# Patient Record
Sex: MALE | Race: WHITE | NOT HISPANIC OR LATINO | Employment: OTHER | URBAN - NONMETROPOLITAN AREA
[De-identification: names, ages, dates, MRNs, and addresses within clinical notes are randomized per-mention and may not be internally consistent; named-entity substitution may affect disease eponyms.]

---

## 2017-03-12 ENCOUNTER — HOSPITAL ENCOUNTER (INPATIENT)
Facility: HOSPITAL | Age: 47
LOS: 4 days | Discharge: HOME | End: 2017-03-16
Attending: EMERGENCY MEDICINE | Admitting: INTERNAL MEDICINE

## 2017-03-12 DIAGNOSIS — I25.119 CORONARY ARTERY DISEASE INVOLVING NATIVE CORONARY ARTERY OF NATIVE HEART WITH ANGINA PECTORIS (HCC): ICD-10-CM

## 2017-03-12 DIAGNOSIS — I15.9 SECONDARY HYPERTENSION: ICD-10-CM

## 2017-03-12 DIAGNOSIS — I21.3 ST ELEVATION MYOCARDIAL INFARCTION (STEMI), UNSPECIFIED ARTERY (HCC): Primary | ICD-10-CM

## 2017-03-12 DIAGNOSIS — I21.19 ST ELEVATION MYOCARDIAL INFARCTION (STEMI) OF INFERIOR WALL (HCC): ICD-10-CM

## 2017-03-12 PROBLEM — I10 ESSENTIAL HYPERTENSION: Status: ACTIVE | Noted: 2017-03-12

## 2017-03-12 PROBLEM — I25.10 CORONARY ARTERY DISEASE INVOLVING NATIVE CORONARY ARTERY OF NATIVE HEART: Status: ACTIVE | Noted: 2017-03-12

## 2017-03-12 LAB
ALBUMIN SERPL-MCNC: 4.6 G/DL (ref 3.5–5)
ALBUMIN/GLOB SERPL: 1.3 G/DL (ref 1–2)
ALP SERPL-CCNC: 96 U/L (ref 38–126)
ALT SERPL W P-5'-P-CCNC: 47 U/L (ref 13–69)
ANION GAP SERPL CALCULATED.3IONS-SCNC: 13 MMOL/L
AST SERPL-CCNC: 37 U/L (ref 15–46)
BASOPHILS # BLD AUTO: 0.05 10*3/MM3 (ref 0–0.2)
BASOPHILS NFR BLD AUTO: 0.4 % (ref 0–2.5)
BILIRUB SERPL-MCNC: 0.6 MG/DL (ref 0.2–1.3)
BUN BLD-MCNC: 20 MG/DL (ref 7–20)
BUN/CREAT SERPL: 18.2 (ref 6.3–21.9)
CALCIUM SPEC-SCNC: 10.2 MG/DL (ref 8.4–10.2)
CHLORIDE SERPL-SCNC: 100 MMOL/L (ref 98–107)
CO2 SERPL-SCNC: 32 MMOL/L (ref 26–30)
CREAT BLD-MCNC: 1.1 MG/DL (ref 0.6–1.3)
DEPRECATED RDW RBC AUTO: 42.2 FL (ref 37–54)
EOSINOPHIL # BLD AUTO: 0.03 10*3/MM3 (ref 0–0.7)
EOSINOPHIL NFR BLD AUTO: 0.3 % (ref 0–7)
ERYTHROCYTE [DISTWIDTH] IN BLOOD BY AUTOMATED COUNT: 12.9 % (ref 11.5–14.5)
GFR SERPL CREATININE-BSD FRML MDRD: 73 ML/MIN/1.73
GLOBULIN UR ELPH-MCNC: 3.6 GM/DL
GLUCOSE BLD-MCNC: 142 MG/DL (ref 74–98)
HCT VFR BLD AUTO: 48.7 % (ref 42–52)
HGB BLD-MCNC: 16.6 G/DL (ref 14–18)
IMM GRANULOCYTES # BLD: 0.04 10*3/MM3 (ref 0–0.06)
IMM GRANULOCYTES NFR BLD: 0.4 % (ref 0–0.6)
LYMPHOCYTES # BLD AUTO: 1.6 10*3/MM3 (ref 0.6–3.4)
LYMPHOCYTES NFR BLD AUTO: 14.3 % (ref 10–50)
MCH RBC QN AUTO: 30.4 PG (ref 27–31)
MCHC RBC AUTO-ENTMCNC: 34.1 G/DL (ref 30–37)
MCV RBC AUTO: 89.2 FL (ref 80–94)
MONOCYTES # BLD AUTO: 0.56 10*3/MM3 (ref 0–0.9)
MONOCYTES NFR BLD AUTO: 5 % (ref 0–12)
NEUTROPHILS # BLD AUTO: 8.88 10*3/MM3 (ref 2–6.9)
NEUTROPHILS NFR BLD AUTO: 79.6 % (ref 37–80)
NRBC BLD MANUAL-RTO: 0 /100 WBC (ref 0–0)
PLATELET # BLD AUTO: 303 10*3/MM3 (ref 130–400)
PMV BLD AUTO: 10 FL (ref 6–12)
POTASSIUM BLD-SCNC: 4 MMOL/L (ref 3.5–5.1)
PROT SERPL-MCNC: 8.2 G/DL (ref 6.3–8.2)
RBC # BLD AUTO: 5.46 10*6/MM3 (ref 4.7–6.1)
SODIUM BLD-SCNC: 141 MMOL/L (ref 137–145)
TROPONIN I SERPL-MCNC: 0.19 NG/ML (ref 0–0.05)
WBC NRBC COR # BLD: 11.16 10*3/MM3 (ref 4.8–10.8)

## 2017-03-12 PROCEDURE — 4A023N7 MEASUREMENT OF CARDIAC SAMPLING AND PRESSURE, LEFT HEART, PERCUTANEOUS APPROACH: ICD-10-PCS | Performed by: INTERNAL MEDICINE

## 2017-03-12 PROCEDURE — 84484 ASSAY OF TROPONIN QUANT: CPT | Performed by: EMERGENCY MEDICINE

## 2017-03-12 PROCEDURE — C9606 PERC D-E COR REVASC W AMI S: HCPCS | Performed by: INTERNAL MEDICINE

## 2017-03-12 PROCEDURE — C1887 CATHETER, GUIDING: HCPCS | Performed by: INTERNAL MEDICINE

## 2017-03-12 PROCEDURE — C1769 GUIDE WIRE: HCPCS | Performed by: INTERNAL MEDICINE

## 2017-03-12 PROCEDURE — 92941 PRQ TRLML REVSC TOT OCCL AMI: CPT | Performed by: INTERNAL MEDICINE

## 2017-03-12 PROCEDURE — 99291 CRITICAL CARE FIRST HOUR: CPT

## 2017-03-12 PROCEDURE — C1874 STENT, COATED/COV W/DEL SYS: HCPCS | Performed by: INTERNAL MEDICINE

## 2017-03-12 PROCEDURE — C9600 PERC DRUG-EL COR STENT SING: HCPCS | Performed by: INTERNAL MEDICINE

## 2017-03-12 PROCEDURE — 85347 COAGULATION TIME ACTIVATED: CPT

## 2017-03-12 PROCEDURE — 0272366 DILATION OF CORONARY ARTERY, THREE ARTERIES, BIFURCATION, WITH THREE DRUG-ELUTING INTRALUMINAL DEVICES, PERCUTANEOUS APPROACH: ICD-10-PCS | Performed by: INTERNAL MEDICINE

## 2017-03-12 PROCEDURE — C9601 PERC DRUG-EL COR STENT BRAN: HCPCS | Performed by: INTERNAL MEDICINE

## 2017-03-12 PROCEDURE — 0 IOPAMIDOL PER 1 ML: Performed by: INTERNAL MEDICINE

## 2017-03-12 PROCEDURE — 93454 CORONARY ARTERY ANGIO S&I: CPT | Performed by: INTERNAL MEDICINE

## 2017-03-12 PROCEDURE — 92928 PRQ TCAT PLMT NTRAC ST 1 LES: CPT | Performed by: INTERNAL MEDICINE

## 2017-03-12 PROCEDURE — 25010000002 HEPARIN (PORCINE) PER 1000 UNITS: Performed by: INTERNAL MEDICINE

## 2017-03-12 PROCEDURE — 99223 1ST HOSP IP/OBS HIGH 75: CPT | Performed by: INTERNAL MEDICINE

## 2017-03-12 PROCEDURE — 25010000002 MORPHINE PER 10 MG

## 2017-03-12 PROCEDURE — 25010000002 EPTIFIBATIDE PER 5 MG: Performed by: INTERNAL MEDICINE

## 2017-03-12 PROCEDURE — C1725 CATH, TRANSLUMIN NON-LASER: HCPCS | Performed by: INTERNAL MEDICINE

## 2017-03-12 PROCEDURE — 93005 ELECTROCARDIOGRAM TRACING: CPT

## 2017-03-12 PROCEDURE — C1894 INTRO/SHEATH, NON-LASER: HCPCS | Performed by: INTERNAL MEDICINE

## 2017-03-12 PROCEDURE — 85025 COMPLETE CBC W/AUTO DIFF WBC: CPT | Performed by: STUDENT IN AN ORGANIZED HEALTH CARE EDUCATION/TRAINING PROGRAM

## 2017-03-12 PROCEDURE — 80053 COMPREHEN METABOLIC PANEL: CPT | Performed by: STUDENT IN AN ORGANIZED HEALTH CARE EDUCATION/TRAINING PROGRAM

## 2017-03-12 PROCEDURE — 25010000002 MIDAZOLAM PER 1 MG: Performed by: INTERNAL MEDICINE

## 2017-03-12 DEVICE — XIENCE ALPINE EVEROLIMUS ELUTING CORONARY STENT SYSTEM 2.75 MM X 18 MM / RAPID-EXCHANGE
Type: IMPLANTABLE DEVICE | Site: ARTERIAL | Status: FUNCTIONAL
Brand: XIENCE ALPINE

## 2017-03-12 DEVICE — XIENCE ALPINE EVEROLIMUS ELUTING CORONARY STENT SYSTEM 4.00 MM X 33 MM / RAPID-EXCHANGE
Type: IMPLANTABLE DEVICE | Site: ARTERIAL | Status: FUNCTIONAL
Brand: XIENCE ALPINE

## 2017-03-12 DEVICE — XIENCE ALPINE EVEROLIMUS ELUTING CORONARY STENT SYSTEM 2.50 MM X 12 MM / RAPID-EXCHANGE
Type: IMPLANTABLE DEVICE | Site: ARTERIAL | Status: FUNCTIONAL
Brand: XIENCE ALPINE

## 2017-03-12 RX ORDER — METOPROLOL TARTRATE 5 MG/5ML
5 INJECTION INTRAVENOUS ONCE
Status: CANCELLED | OUTPATIENT
Start: 2017-03-12 | End: 2017-03-12

## 2017-03-12 RX ORDER — HEPARIN SODIUM 1000 [USP'U]/ML
INJECTION, SOLUTION INTRAVENOUS; SUBCUTANEOUS AS NEEDED
Status: DISCONTINUED | OUTPATIENT
Start: 2017-03-12 | End: 2017-03-12 | Stop reason: HOSPADM

## 2017-03-12 RX ORDER — METOPROLOL TARTRATE 5 MG/5ML
INJECTION INTRAVENOUS
Status: DISCONTINUED | OUTPATIENT
Start: 2017-03-12 | End: 2017-03-16 | Stop reason: HOSPADM

## 2017-03-12 RX ORDER — NITROGLYCERIN 5 MG/ML
INJECTION, SOLUTION INTRAVENOUS AS NEEDED
Status: DISCONTINUED | OUTPATIENT
Start: 2017-03-12 | End: 2017-03-12 | Stop reason: HOSPADM

## 2017-03-12 RX ORDER — NITROGLYCERIN 0.4 MG/1
0.4 TABLET SUBLINGUAL
Status: CANCELLED | OUTPATIENT
Start: 2017-03-12

## 2017-03-12 RX ORDER — METOPROLOL TARTRATE 5 MG/5ML
5 INJECTION INTRAVENOUS ONCE
Status: COMPLETED | OUTPATIENT
Start: 2017-03-12 | End: 2017-03-12

## 2017-03-12 RX ORDER — LIDOCAINE HYDROCHLORIDE 10 MG/ML
INJECTION, SOLUTION INFILTRATION; PERINEURAL AS NEEDED
Status: DISCONTINUED | OUTPATIENT
Start: 2017-03-12 | End: 2017-03-12 | Stop reason: HOSPADM

## 2017-03-12 RX ORDER — CLOPIDOGREL BISULFATE 75 MG/1
600 TABLET ORAL ONCE
Status: DISCONTINUED | OUTPATIENT
Start: 2017-03-12 | End: 2017-03-12

## 2017-03-12 RX ORDER — EPTIFIBATIDE 0.75 MG/ML
INJECTION, SOLUTION INTRAVENOUS CONTINUOUS PRN
Status: DISCONTINUED | OUTPATIENT
Start: 2017-03-12 | End: 2017-03-12 | Stop reason: HOSPADM

## 2017-03-12 RX ORDER — MORPHINE SULFATE 2 MG/ML
INJECTION, SOLUTION INTRAMUSCULAR; INTRAVENOUS AS NEEDED
Status: DISCONTINUED | OUTPATIENT
Start: 2017-03-12 | End: 2017-03-12 | Stop reason: HOSPADM

## 2017-03-12 RX ORDER — CLOPIDOGREL BISULFATE 75 MG/1
600 TABLET ORAL ONCE
Status: CANCELLED | OUTPATIENT
Start: 2017-03-12 | End: 2017-03-12

## 2017-03-12 RX ORDER — ASPIRIN 81 MG/1
324 TABLET, CHEWABLE ORAL ONCE
Status: CANCELLED | OUTPATIENT
Start: 2017-03-12 | End: 2017-03-12

## 2017-03-12 RX ORDER — LABETALOL HYDROCHLORIDE 5 MG/ML
INJECTION, SOLUTION INTRAVENOUS AS NEEDED
Status: DISCONTINUED | OUTPATIENT
Start: 2017-03-12 | End: 2017-03-12 | Stop reason: HOSPADM

## 2017-03-12 RX ORDER — SODIUM CHLORIDE 0.9 % (FLUSH) 0.9 %
10 SYRINGE (ML) INJECTION AS NEEDED
Status: CANCELLED | OUTPATIENT
Start: 2017-03-12

## 2017-03-12 RX ORDER — LISINOPRIL 40 MG/1
40 TABLET ORAL DAILY
Status: ON HOLD | COMMUNITY
End: 2017-03-13

## 2017-03-12 RX ORDER — MIDAZOLAM HYDROCHLORIDE 1 MG/ML
INJECTION INTRAMUSCULAR; INTRAVENOUS AS NEEDED
Status: DISCONTINUED | OUTPATIENT
Start: 2017-03-12 | End: 2017-03-12 | Stop reason: HOSPADM

## 2017-03-12 RX ORDER — NITROGLYCERIN 0.4 MG/1
TABLET SUBLINGUAL
Status: DISCONTINUED | OUTPATIENT
Start: 2017-03-12 | End: 2017-03-16 | Stop reason: HOSPADM

## 2017-03-12 RX ORDER — FLUOXETINE HYDROCHLORIDE 20 MG/1
40 CAPSULE ORAL DAILY
COMMUNITY

## 2017-03-12 RX ADMIN — NITROGLYCERIN 0.4 MG: 0.4 TABLET SUBLINGUAL at 20:59

## 2017-03-12 RX ADMIN — NITROGLYCERIN 0.4 MG: 0.4 TABLET SUBLINGUAL at 21:05

## 2017-03-12 RX ADMIN — METOPROLOL TARTRATE 5 MG: 1 INJECTION, SOLUTION INTRAVENOUS at 21:15

## 2017-03-12 RX ADMIN — ASPIRIN 325 MG ORAL TABLET 325 MG: 325 PILL ORAL at 21:05

## 2017-03-12 RX ADMIN — TICAGRELOR 180 MG: 90 TABLET ORAL at 21:05

## 2017-03-12 RX ADMIN — METOPROLOL TARTRATE 5 MG: 1 INJECTION, SOLUTION INTRAVENOUS at 21:04

## 2017-03-12 RX ADMIN — METOPROLOL TARTRATE 5 MG: 1 INJECTION, SOLUTION INTRAVENOUS at 21:14

## 2017-03-12 RX ADMIN — NITROGLYCERIN 0.4 MG: 0.4 TABLET SUBLINGUAL at 21:08

## 2017-03-12 RX ADMIN — NITROGLYCERIN 0.4 MG: 0.4 TABLET SUBLINGUAL at 21:09

## 2017-03-13 LAB
ACT BLD: 208 SECONDS (ref 82–152)
ACT BLD: 325 SECONDS (ref 82–152)
ANION GAP SERPL CALCULATED.3IONS-SCNC: 12.2 MMOL/L
BUN BLD-MCNC: 17 MG/DL (ref 7–20)
BUN/CREAT SERPL: 17 (ref 6.3–21.9)
CALCIUM SPEC-SCNC: 9 MG/DL (ref 8.4–10.2)
CHLORIDE SERPL-SCNC: 103 MMOL/L (ref 98–107)
CHOLEST SERPL-MCNC: 158 MG/DL (ref 0–199)
CO2 SERPL-SCNC: 28 MMOL/L (ref 26–30)
CREAT BLD-MCNC: 1 MG/DL (ref 0.6–1.3)
DEPRECATED RDW RBC AUTO: 42 FL (ref 37–54)
ERYTHROCYTE [DISTWIDTH] IN BLOOD BY AUTOMATED COUNT: 12.9 % (ref 11.5–14.5)
GFR SERPL CREATININE-BSD FRML MDRD: 81 ML/MIN/1.73
GLUCOSE BLD-MCNC: 141 MG/DL (ref 74–98)
HCT VFR BLD AUTO: 42.9 % (ref 42–52)
HDLC SERPL-MCNC: 32 MG/DL (ref 40–60)
HGB BLD-MCNC: 14.5 G/DL (ref 14–18)
HOLD SPECIMEN: NORMAL
HOLD SPECIMEN: NORMAL
LDLC SERPL CALC-MCNC: 94 MG/DL (ref 0–99)
LDLC/HDLC SERPL: 2.94 {RATIO}
MCH RBC QN AUTO: 30 PG (ref 27–31)
MCHC RBC AUTO-ENTMCNC: 33.8 G/DL (ref 30–37)
MCV RBC AUTO: 88.8 FL (ref 80–94)
PLATELET # BLD AUTO: 259 10*3/MM3 (ref 130–400)
PMV BLD AUTO: 10.1 FL (ref 6–12)
POTASSIUM BLD-SCNC: 4.2 MMOL/L (ref 3.5–5.1)
RBC # BLD AUTO: 4.83 10*6/MM3 (ref 4.7–6.1)
SODIUM BLD-SCNC: 139 MMOL/L (ref 137–145)
TRIGL SERPL-MCNC: 159 MG/DL
VLDLC SERPL-MCNC: 31.8 MG/DL
WBC NRBC COR # BLD: 14.6 10*3/MM3 (ref 4.8–10.8)
WHOLE BLOOD HOLD SPECIMEN: NORMAL
WHOLE BLOOD HOLD SPECIMEN: NORMAL

## 2017-03-13 PROCEDURE — 25010000002 EPTIFIBATIDE PER 5 MG: Performed by: INTERNAL MEDICINE

## 2017-03-13 PROCEDURE — 80048 BASIC METABOLIC PNL TOTAL CA: CPT | Performed by: INTERNAL MEDICINE

## 2017-03-13 PROCEDURE — 83036 HEMOGLOBIN GLYCOSYLATED A1C: CPT | Performed by: INTERNAL MEDICINE

## 2017-03-13 PROCEDURE — 25010000002 MORPHINE PER 10 MG: Performed by: INTERNAL MEDICINE

## 2017-03-13 PROCEDURE — 99232 SBSQ HOSP IP/OBS MODERATE 35: CPT | Performed by: INTERNAL MEDICINE

## 2017-03-13 PROCEDURE — 94799 UNLISTED PULMONARY SVC/PX: CPT

## 2017-03-13 PROCEDURE — 80061 LIPID PANEL: CPT | Performed by: INTERNAL MEDICINE

## 2017-03-13 PROCEDURE — 85027 COMPLETE CBC AUTOMATED: CPT | Performed by: INTERNAL MEDICINE

## 2017-03-13 PROCEDURE — 87081 CULTURE SCREEN ONLY: CPT | Performed by: INTERNAL MEDICINE

## 2017-03-13 RX ORDER — ATORVASTATIN CALCIUM 80 MG/1
80 TABLET, FILM COATED ORAL NIGHTLY
Status: DISCONTINUED | OUTPATIENT
Start: 2017-03-13 | End: 2017-03-16 | Stop reason: HOSPADM

## 2017-03-13 RX ORDER — FLUOXETINE HYDROCHLORIDE 20 MG/1
40 CAPSULE ORAL DAILY
Status: DISCONTINUED | OUTPATIENT
Start: 2017-03-13 | End: 2017-03-16 | Stop reason: HOSPADM

## 2017-03-13 RX ORDER — HYDROCODONE BITARTRATE AND ACETAMINOPHEN 5; 325 MG/1; MG/1
1 TABLET ORAL EVERY 4 HOURS PRN
Status: DISCONTINUED | OUTPATIENT
Start: 2017-03-13 | End: 2017-03-16

## 2017-03-13 RX ORDER — ONDANSETRON 4 MG/1
4 TABLET, ORALLY DISINTEGRATING ORAL EVERY 6 HOURS PRN
Status: DISCONTINUED | OUTPATIENT
Start: 2017-03-13 | End: 2017-03-16

## 2017-03-13 RX ORDER — CALCIUM CARBONATE 200(500)MG
2 TABLET,CHEWABLE ORAL 3 TIMES DAILY PRN
Status: DISCONTINUED | OUTPATIENT
Start: 2017-03-13 | End: 2017-03-16

## 2017-03-13 RX ORDER — RAMIPRIL 5 MG/1
5 CAPSULE ORAL DAILY
Status: DISCONTINUED | OUTPATIENT
Start: 2017-03-13 | End: 2017-03-16 | Stop reason: HOSPADM

## 2017-03-13 RX ORDER — METOPROLOL TARTRATE 50 MG/1
50 TABLET, FILM COATED ORAL EVERY 12 HOURS SCHEDULED
Status: DISCONTINUED | OUTPATIENT
Start: 2017-03-13 | End: 2017-03-15

## 2017-03-13 RX ORDER — DIPHENHYDRAMINE HYDROCHLORIDE 50 MG/ML
25 INJECTION INTRAMUSCULAR; INTRAVENOUS EVERY 6 HOURS PRN
Status: DISCONTINUED | OUTPATIENT
Start: 2017-03-13 | End: 2017-03-16

## 2017-03-13 RX ORDER — ONDANSETRON 4 MG/1
4 TABLET, FILM COATED ORAL EVERY 6 HOURS PRN
Status: DISCONTINUED | OUTPATIENT
Start: 2017-03-13 | End: 2017-03-16

## 2017-03-13 RX ORDER — ASPIRIN 325 MG
325 TABLET ORAL ONCE
Status: COMPLETED | OUTPATIENT
Start: 2017-03-12 | End: 2017-03-12

## 2017-03-13 RX ORDER — SODIUM CHLORIDE 9 MG/ML
100 INJECTION, SOLUTION INTRAVENOUS CONTINUOUS
Status: DISCONTINUED | OUTPATIENT
Start: 2017-03-13 | End: 2017-03-13

## 2017-03-13 RX ORDER — SENNA AND DOCUSATE SODIUM 50; 8.6 MG/1; MG/1
2 TABLET, FILM COATED ORAL NIGHTLY
Status: DISCONTINUED | OUTPATIENT
Start: 2017-03-13 | End: 2017-03-16

## 2017-03-13 RX ORDER — NICOTINE 21 MG/24HR
1 PATCH, TRANSDERMAL 24 HOURS TRANSDERMAL EVERY 24 HOURS
Status: DISCONTINUED | OUTPATIENT
Start: 2017-03-13 | End: 2017-03-16 | Stop reason: HOSPADM

## 2017-03-13 RX ORDER — NITROGLYCERIN 0.4 MG/1
0.4 TABLET SUBLINGUAL ONCE
Status: COMPLETED | OUTPATIENT
Start: 2017-03-12 | End: 2017-03-12

## 2017-03-13 RX ORDER — ASPIRIN 81 MG/1
81 TABLET, CHEWABLE ORAL DAILY
Status: DISCONTINUED | OUTPATIENT
Start: 2017-03-13 | End: 2017-03-16 | Stop reason: HOSPADM

## 2017-03-13 RX ORDER — MORPHINE SULFATE 2 MG/ML
1 INJECTION, SOLUTION INTRAMUSCULAR; INTRAVENOUS EVERY 4 HOURS PRN
Status: DISCONTINUED | OUTPATIENT
Start: 2017-03-13 | End: 2017-03-16

## 2017-03-13 RX ORDER — NALOXONE HCL 0.4 MG/ML
0.4 VIAL (ML) INJECTION
Status: DISCONTINUED | OUTPATIENT
Start: 2017-03-13 | End: 2017-03-16

## 2017-03-13 RX ORDER — ALPRAZOLAM 0.5 MG/1
0.5 TABLET ORAL 3 TIMES DAILY PRN
Status: DISCONTINUED | OUTPATIENT
Start: 2017-03-13 | End: 2017-03-16

## 2017-03-13 RX ORDER — TEMAZEPAM 15 MG/1
15 CAPSULE ORAL NIGHTLY PRN
Status: DISCONTINUED | OUTPATIENT
Start: 2017-03-13 | End: 2017-03-16

## 2017-03-13 RX ORDER — METOPROLOL TARTRATE 5 MG/5ML
5 INJECTION INTRAVENOUS ONCE
Status: COMPLETED | OUTPATIENT
Start: 2017-03-12 | End: 2017-03-12

## 2017-03-13 RX ORDER — BISACODYL 10 MG
10 SUPPOSITORY, RECTAL RECTAL DAILY PRN
Status: DISCONTINUED | OUTPATIENT
Start: 2017-03-13 | End: 2017-03-16

## 2017-03-13 RX ORDER — EPTIFIBATIDE 0.75 MG/ML
2 INJECTION, SOLUTION INTRAVENOUS CONTINUOUS
Status: DISCONTINUED | OUTPATIENT
Start: 2017-03-13 | End: 2017-03-13

## 2017-03-13 RX ORDER — ONDANSETRON 2 MG/ML
4 INJECTION INTRAMUSCULAR; INTRAVENOUS EVERY 6 HOURS PRN
Status: DISCONTINUED | OUTPATIENT
Start: 2017-03-13 | End: 2017-03-16

## 2017-03-13 RX ADMIN — HYDROCODONE BITARTRATE AND ACETAMINOPHEN 1 TABLET: 5; 325 TABLET ORAL at 19:55

## 2017-03-13 RX ADMIN — RAMIPRIL 5 MG: 5 CAPSULE ORAL at 09:07

## 2017-03-13 RX ADMIN — EPTIFIBATIDE 2 MCG/KG/MIN: 75 INJECTION INTRAVENOUS at 00:50

## 2017-03-13 RX ADMIN — SODIUM CHLORIDE 100 ML/HR: 9 INJECTION, SOLUTION INTRAVENOUS at 00:50

## 2017-03-13 RX ADMIN — MORPHINE SULFATE 1 MG: 2 INJECTION, SOLUTION INTRAMUSCULAR; INTRAVENOUS at 08:42

## 2017-03-13 RX ADMIN — TICAGRELOR 90 MG: 90 TABLET ORAL at 09:07

## 2017-03-13 RX ADMIN — EPTIFIBATIDE 2 MCG/KG/MIN: 75 INJECTION INTRAVENOUS at 03:31

## 2017-03-13 RX ADMIN — ATORVASTATIN CALCIUM 80 MG: 80 TABLET, FILM COATED ORAL at 00:31

## 2017-03-13 RX ADMIN — SODIUM CHLORIDE 100 ML/HR: 9 INJECTION, SOLUTION INTRAVENOUS at 06:00

## 2017-03-13 RX ADMIN — METOPROLOL TARTRATE 50 MG: 50 TABLET ORAL at 20:49

## 2017-03-13 RX ADMIN — METOPROLOL TARTRATE 50 MG: 50 TABLET ORAL at 00:31

## 2017-03-13 RX ADMIN — ATORVASTATIN CALCIUM 80 MG: 80 TABLET, FILM COATED ORAL at 20:50

## 2017-03-13 RX ADMIN — HYDROCODONE BITARTRATE AND ACETAMINOPHEN 1 TABLET: 5; 325 TABLET ORAL at 11:14

## 2017-03-13 RX ADMIN — FLUOXETINE 40 MG: 20 CAPSULE ORAL at 11:14

## 2017-03-13 RX ADMIN — METOPROLOL TARTRATE 50 MG: 50 TABLET ORAL at 09:07

## 2017-03-13 RX ADMIN — NICOTINE 1 PATCH: 21 PATCH TRANSDERMAL at 00:31

## 2017-03-13 RX ADMIN — TICAGRELOR 90 MG: 90 TABLET ORAL at 18:04

## 2017-03-13 RX ADMIN — ASPIRIN 81 MG 81 MG: 81 TABLET ORAL at 09:07

## 2017-03-13 RX ADMIN — DOCUSATE SODIUM AND SENNOSIDES 2 TABLET: 8.6; 5 TABLET, FILM COATED ORAL at 00:31

## 2017-03-13 NOTE — PROGRESS NOTES
"[facility]-Cardiology Progress note     LOS: 1 day   Patient Care Team:  No Known Provider as PCP - General    Chief Complaint:  Chest pain    Subjective     Interval History:   Over the last 12 hours the patient has had one brief episode of localized chest discomfort in his lower central sternum that occurred at rest and was described as a dull aching pain that lasted less than 5 seconds.  He has had no recurrence of the retrosternal burning discomfort which prompted admission.  His 12-lead electrocardiogram has normalized with ST segment elevation returning to baseline.  He has had no signs or symptoms of congestive heart failure and there has been no arrhythmia on the monitor.  He is currently completing an 18 hour course of Integrilin.  He has had no bleeding symptoms.  His hemoglobin and hematocrit have remained stable.  He has had no symptoms related to his catheterization access from the left radial approach.  Kidney function is normal.  Due to the complexity of his catheterization and his large body habitus the patient received a total of 6.0 esther of radiation.  His chest and back has been inspected and there are no signs of erythema or other radiation-related skin injury.  Patient Complaints: none  Patient Denies:  Shortness of breath, orthopnea, PND, lower extremity edema, palpitations  History taken from: patient    Review of Systems:   All systems were reviewed and negative       Objective     Vital Sign Min/Max for last 24 hours  Temp  Min: 98 °F (36.7 °C)  Max: 98.7 °F (37.1 °C)   BP  Min: 144/107  Max: 218/145   Pulse  Min: 72  Max: 100   Resp  Min: 16  Max: 20   SpO2  Min: 94 %  Max: 100 %   Flow (L/min)  Min: 2  Max: 2   Weight  Min: 270 lb (122 kg)  Max: 275 lb 4.8 oz (125 kg)     Flowsheet Rows         First Filed Value    Admission Height  69\" (175.3 cm) Documented at 03/12/2017 2042    Admission Weight  270 lb (122 kg) Documented at 03/12/2017 2042          Physical Exam:     General Appearance:    " Alert, cooperative, in no acute distress   Head:    Normocephalic, without obvious abnormality, atraumatic   Eyes:            Lids and lashes normal, conjunctivae and sclerae normal, no   icterus, no pallor, corneas clear, PERRLA   Ears:    Ears appear intact with no abnormalities noted   Throat:   No oral lesions, no thrush, oral mucosa moist   Neck:   No adenopathy, supple, trachea midline, no thyromegaly, no   carotid bruit, no JVD   Back:     No kyphosis present, no scoliosis present, no skin lesions,      erythema or scars, no tenderness to percussion or                   palpation,   range of motion normal   Lungs:     Clear to auscultation,respirations regular, even and                  unlabored    Heart:    Regular rhythm and normal rate, normal S1 and S2, no            murmur, no gallop, no rub, no click   Chest Wall:    No abnormalities observed   Abdomen:     Normal bowel sounds, no masses, no organomegaly, soft        non-tender, non-distended, no guarding, no rebound                tenderness   Rectal:     Deferred   Extremities:   Moves all extremities well, no edema, no cyanosis, no             redness   Pulses:   Pulses palpable and equal bilaterally   Skin:   No bleeding, bruising or rash   Lymph nodes:   No palpable adenopathy   Neurologic:   Cranial nerves 2 - 12 grossly intact, sensation intact, DTR       present and equal bilaterally        Results Review:     I reviewed the patient's new clinical results.        Results from last 7 days  Lab Units 03/13/17  0440   SODIUM mmol/L 139   POTASSIUM mmol/L 4.2   CHLORIDE mmol/L 103   TOTAL CO2 mmol/L 28.0   BUN mg/dL 17   CREATININE mg/dL 1.00   GLUCOSE mg/dL 141*   CALCIUM mg/dL 9.0       Results from last 7 days  Lab Units 03/13/17  0440 03/12/17  2045   WBC 10*3/mm3 14.60* 11.16*   HEMOGLOBIN g/dL 14.5 16.6   HEMATOCRIT % 42.9 48.7   PLATELETS 10*3/mm3 259 303         Physical Exam    Medication Review: yes    Assessment/Plan     Principal  Problem:    ST elevation myocardial infarction (STEMI) of inferior wall- resolution of chest discomfort and normalization of the 12-lead electrocardiogram  Active Problems:    Coronary artery disease involving native coronary artery of native heart- the patient had a terribly complex occlusion of the large first obtuse marginal branch which was a proximally bifurcating lesion.  The ostium of the vessel was thrombotic clinically occluded and the ostium of a lower pole branch was also thrombotic clinically occluded.  This required a complex bifurcational stent procedure in order to achieve an adequate result.  Unfortunately the patient was extremely uncooperative being unable to hold his left arm over his lower abdomen for any length of time(the procedure was performed from the left radial approach).  Ultimately the patient required a final kissing balloon to optimize the stent result but this was not possible due to poor guide catheter support as well as lack of adequate lumen in the 6 Bangladeshi sheath.  When attempting to adjust the position of one angioplasty balloon it would greatly alter the position of the other angioplasty balloon in the opposing vessel.  Ultimately all guide catheter access was lost and despite multiple efforts the left coronary could not be reengaged from the left radial approach.  For this reason the patient will require a staged procedure in 48 hours performed from the femoral approach using an 8 Bangladeshi system.  This will allow kissing balloon angioplasty to optimize the bifurcational stent procedure.    Essential hypertension    ST elevation myocardial infarction (STEMI)- Killip class I.  No recurrent angina, signs or symptoms of congestive heart failure or arrhythmia.  The patient will transfer out of the intensive care unit today.              Dale Stuart MD  03/13/17  9:35 AM

## 2017-03-13 NOTE — PLAN OF CARE
Problem: Pain, Acute (Adult)  Goal: Acceptable Pain Control/Comfort Level  Outcome: Ongoing (interventions implemented as appropriate)    Problem: Hypertensive Disease/Crisis (Arterial) (Adult)  Goal: Signs and Symptoms of Listed Potential Problems Will be Absent or Manageable (Hypertensive Disease/Crisis)  Outcome: Ongoing (interventions implemented as appropriate)

## 2017-03-13 NOTE — PLAN OF CARE
Problem: Pain, Acute (Adult)  Goal: Identify Related Risk Factors and Signs and Symptoms  Outcome: Outcome(s) achieved Date Met:  03/13/17  Goal: Acceptable Pain Control/Comfort Level  Outcome: Ongoing (interventions implemented as appropriate)    Problem: Hypertensive Disease/Crisis (Arterial) (Adult)  Goal: Signs and Symptoms of Listed Potential Problems Will be Absent or Manageable (Hypertensive Disease/Crisis)  Outcome: Ongoing (interventions implemented as appropriate)

## 2017-03-13 NOTE — NURSING NOTE
Report to Monika Ballesteros RN Third Floor.  Pt was transported to [address]/ [facility].

## 2017-03-13 NOTE — PLAN OF CARE
Problem: Cardiac Catheterization with/without PCI (Adult)  Goal: Signs and Symptoms of Listed Potential Problems Will be Absent or Manageable (Cardiac Catheterization with/without PCI)  Outcome: Ongoing (interventions implemented as appropriate)    03/13/17 0831   Cardiac Catheterization with/without PCI   Problems Assessed (Cardiac Catheterization) all   Problems Present (Cardiac Catheterization) none         Problem: Pain, Acute (Adult)  Goal: Identify Related Risk Factors and Signs and Symptoms  Outcome: Outcome(s) achieved Date Met:  03/13/17  Goal: Acceptable Pain Control/Comfort Level  Outcome: Ongoing (interventions implemented as appropriate)

## 2017-03-13 NOTE — NURSING NOTE
Pt transferred from ICU to room 326.  Pt walked from bed in the hallway to the bed in the room.  No complaints of pain and pt is resting comfortable.  Izabela RN gave report. Patient has been educated on the call light and his room.  Will continue to monitor patient.

## 2017-03-13 NOTE — ED PROVIDER NOTES
Subjective   HPI Comments: Patient presents the emergency department with complaint of crushing chest pain that began substernally approximately 90 minutes prior to arrival while at rest.  Patient denies any shortness of breath is experiencing pain going straight through to his back as well as both sides of his jaw.  Denies syncope or diaphoresis or vomiting.  Patient has no prior history of coronary artery disease.  He has a well-documented history of hypertension and has been out of his blood pressure medications for approximately 1 month now.      Dr. Garza was consulted immediately EKG changes are noted.      History provided by:  Patient      Review of Systems   Constitutional: Negative.  Negative for diaphoresis and fever.   HENT: Negative for sore throat.    Eyes: Negative.  Negative for pain and visual disturbance.   Respiratory: Positive for chest tightness. Negative for cough, shortness of breath, wheezing and stridor.    Cardiovascular: Positive for chest pain. Negative for palpitations and leg swelling.        The history of present illness   Gastrointestinal: Negative.  Negative for abdominal pain, diarrhea, nausea and vomiting.   Endocrine: Negative.    Genitourinary: Negative.  Negative for dysuria.   Musculoskeletal: Negative.  Negative for back pain and neck pain.   Skin: Negative.  Negative for pallor and rash.   Allergic/Immunologic: Negative.    Neurological: Negative.  Negative for syncope and headaches.   Hematological: Negative.    Psychiatric/Behavioral: Negative.  Negative for agitation.   All other systems reviewed and are negative.      Past Medical History   Diagnosis Date   • Hypertension        No Known Allergies    Past Surgical History   Procedure Laterality Date   • Mastoid surgery         History reviewed. No pertinent family history.    Social History     Social History   • Marital status: N/A     Spouse name: N/A   • Number of children: N/A   • Years of education: N/A     Social  History Main Topics   • Smoking status: Current Every Day Smoker     Packs/day: 0.25   • Smokeless tobacco: None   • Alcohol use Yes      Comment: rarely    • Drug use: No   • Sexual activity: Not Asked     Other Topics Concern   • None     Social History Narrative   • None           Objective   Physical Exam   Constitutional: He is oriented to person, place, and time. He appears well-developed.   Patient is very hypertensive.   HENT:   Head: Normocephalic.   Eyes: EOM are normal. Pupils are equal, round, and reactive to light.   Neck: Normal range of motion. Neck supple.   Cardiovascular: Normal rate and regular rhythm.    Pulmonary/Chest: Effort normal. He has no wheezes. He has no rales.   Abdominal: Soft. Bowel sounds are normal. He exhibits no distension. There is no rebound and no guarding.   Musculoskeletal: Normal range of motion. He exhibits no edema.   Neurological: He is alert and oriented to person, place, and time.   Skin: Skin is warm and dry.   Psychiatric: He has a normal mood and affect.   Nursing note and vitals reviewed.      Procedures         ED Course  ED Course   Comment By Time   Dr. Garza is at bedside.  He has already initiated the STEMI team.  Patient is stable. Cindi Anthony, MARICEL 03/12 2102   Activate stemi @2053     Uri Garza MD 03/12 2107                  MDM  Number of Diagnoses or Management Options  Diagnosis management comments: EKG showed sinus rhythm rate of 99.  There are ST elevations in leads 2, 3, aVF as well as V5 and V6.  I-STAT troponin did come back positive at 0.19.  Dr. Stuart was contacted and Cath Lab was activated.  Patient was treated with standard STEMI medications.  Blood pressure is grossly elevated at this time but otherwise is hemodynamically stable       Amount and/or Complexity of Data Reviewed  Clinical lab tests: ordered and reviewed  Obtain history from someone other than the patient: yes    Critical Care  Total time providing critical care:  30-74 minutes      Final diagnoses:   None            Uri Garza MD  03/12/17 9559

## 2017-03-13 NOTE — PROGRESS NOTES
Continued Stay Note  [facility]     Patient Name: Edgar Carbajal  MRN: [id number]  Today's Date: 3/13/2017    Admit Date: 3/12/2017          Discharge Plan       03/13/17 1314    Case Management/Social Work Plan    Plan SW saw patient due to request /concern he could not afford his blood pressure medications. SW spoke to patient who does not feel he has missed any medication. Reveiw of his chart finds he is on Brillinta and has private insurance. Provided patient with contact information as well as a copay card for Brillinta.      Patient/Family In Agreement With Plan yes    Additional Comments Discussed samples and gave copay card for Brillinta.     Final Note    Final Note SW following for medication assistance.       03/13/17 1018    Case Management/Social Work Plan    Plan Discharge Planning    Additional Comments SW met with pt at bedside in ICU for initial discharge planning. Pt states that he lives alone in an apartment and independent with ADL's. Pt denies DME/HH affiliation. Pt also denies home oxygen use. Pt's goal at discharge is to return home. Pt denies any needs at this time. Will continue to follow and assist should discharge needs change.              Discharge Codes     None        Expected Discharge Date and Time     Expected Discharge Date Expected Discharge Time    Mar 14, 2017             Lashawn Cortez

## 2017-03-13 NOTE — NURSING NOTE
CRYSTAL Teaching for MI  Instructed on what it is, causes, signs and symptoms and treatment with teachback

## 2017-03-13 NOTE — H&P
Patient Care Team:  No Known Provider as PCP - General    Chief complaint chest pain    Subjective     Patient is a 46 y.o. male presents with chest pain. Onset of symptoms was abrupt starting 1 hour ago.  Symptoms are associated with shortness of breath, nausea and diaphoresis.  Symptoms are aggravated by the.   Symptoms improve with no specific therapy.  Severity 8/10 in intensity Context occurred at rest Quality retrosternal burning.  The patient indicates that after eating supper he developed a severe burning sensation in his retrosternal area that radiated into his jaws bilaterally and down both arms.  This was associated with nausea, diaphoresis and shortness of breath.  The patient promptly reported to the emergency room where his initial 12-lead electrocardiogram showed ST segment elevation in leads 2,3 and aVF and reciprocal ST segment depression in leads V1 and V2.  This injury pattern was consistent with an acute inferior ST elevation myocardial infarction.  His initial troponin was 0.19.  The patient has no prior history of myocardial infarction or coronary revascularization.  He does have a history of hypertension.  His blood pressure was severely elevated on presentation.  The patient was given aspirin, 180 mg of Birlinta and intravenous Lopressor.  He was also given sublingual nitroglycerin.  The STEMI alert was called immediately.  The patient indicates that he had had no prior chest discomfort before presenting this evening.  He had no dyspnea with activity.  There is no orthopnea PND or lower extremity edema.  He has no dizziness palpitations or syncope.  The patient had no personal history of documented diabetes or hypercholesterolemia.  He has no history of stroke or TIA.  He has no history of previous internal bleeding.  He has no history of documented valvular heart disease, arrhythmia or congestive heart failure.    Review of Systems   Pertinent items are noted in HPI, all other systems  reviewed and negative    History  Past Medical History   Diagnosis Date   • Hypertension      Past Surgical History   Procedure Laterality Date   • Mastoid surgery       History reviewed. No pertinent family history.  Social History   Substance Use Topics   • Smoking status: Current Every Day Smoker     Packs/day: 0.25   • Smokeless tobacco: None   • Alcohol use Yes      Comment: rarely      Prescriptions Prior to Admission   Medication Sig Dispense Refill Last Dose   • FLUoxetine (PROzac) 20 MG capsule Take 40 mg by mouth Daily.   Past Month at Unknown time   • lisinopril (PRINIVIL,ZESTRIL) 40 MG tablet Take 40 mg by mouth Daily.   Past Month at Unknown time     Allergies:  Review of patient's allergies indicates no known allergies.    Objective     Vital Signs  Temp:  [98.7 °F (37.1 °C)] 98.7 °F (37.1 °C)  Heart Rate:  [] 85  Resp:  [20] 20  BP: (150-218)/() 150/98    Physical Exam:      General Appearance:    Alert, cooperative, in no acute distress   Head:    Normocephalic, without obvious abnormality, atraumatic   Eyes:            Lids and lashes normal, conjunctivae and sclerae normal, no   icterus, no pallor, corneas clear, PERRLA   Ears:    Ears appear intact with no abnormalities noted   Throat:   No oral lesions, no thrush, oral mucosa moist   Neck:   No adenopathy, supple, trachea midline, no thyromegaly, no   carotid bruit, no JVD   Back:     No kyphosis present, no scoliosis present, no skin lesions,      erythema or scars, no tenderness to percussion or                   palpation,   range of motion normal   Lungs:     Clear to auscultation,respirations regular, even and                  unlabored    Heart:    Regular rhythm and normal rate, normal S1 and S2, no            murmur, no gallop, no rub, no click   Chest Wall:    No abnormalities observed   Abdomen:     Normal bowel sounds, no masses, no organomegaly, soft        non-tender, non-distended, no guarding, no rebound                 tenderness   Rectal:     Deferred   Extremities:   Moves all extremities well, no edema, no cyanosis, no             redness   Pulses:   Pulses palpable and equal bilaterally   Skin:   No bleeding, bruising or rash   Lymph nodes:   No palpable adenopathy   Neurologic:   Cranial nerves 2 - 12 grossly intact, sensation intact, DTR       present and equal bilaterally     Results Review:    I reviewed the patient's new clinical results.    Assessment/Plan     Principal Problem:    ST elevation myocardial infarction (STEMI) of inferior wall- this is a Killip Class I inferior myocardial infarction.  Onset of symptoms was one hour ago.  The cardiac catheterization procedure with primary PCI was explained in detail to the patient including risks benefits and alternatives.  The patient stressed understanding and a desire to proceed.  Further recommendations will be predicated on the outcome of his catheterization findings.  It is essential the patient discontinue cigarette smoking.  Active Problems:    Coronary artery disease involving native coronary artery of native heart    Essential hypertension    ST elevation myocardial infarction (STEMI)          I discussed the patients findings and my recommendations with patient.     Dale Stuart MD  03/12/17  11:54 PM

## 2017-03-13 NOTE — PROGRESS NOTES
Discharge Planning Assessment   [place]     Patient Name: Edgar Carbajal  MRN: [id number]  Today's Date: 3/13/2017    Admit Date: 3/12/2017          Discharge Needs Assessment       03/13/17 1017    Living Environment    Provides Primary Care For no one    Quality Of Family Relationships supportive    Able to Return to Prior Living Arrangements yes    Discharge Needs Assessment    Concerns To Be Addressed no discharge needs identified;denies needs/concerns at this time            Discharge Plan       03/13/17 1018    Case Management/Social Work Plan    Plan Discharge Planning    Additional Comments SW met with pt at bedside in ICU for initial discharge planning. Pt states that he lives alone in an apartment and independent with ADL's. Pt denies DME/HH affiliation. Pt also denies home oxygen use. Pt's goal at discharge is to return home. Pt denies any needs at this time. Will continue to follow and assist should discharge needs change.        Discharge Placement     No information found                Demographic Summary     None            Functional Status     None            Psychosocial     None            Abuse/Neglect     None            Legal     None            Substance Abuse     None            Patient Forms     None          EZIO Isabel  10:22 AM  03/13/17

## 2017-03-14 LAB — HBA1C MFR BLD: 5.8 % (ref 3–6)

## 2017-03-14 PROCEDURE — 94799 UNLISTED PULMONARY SVC/PX: CPT

## 2017-03-14 PROCEDURE — 93005 ELECTROCARDIOGRAM TRACING: CPT | Performed by: INTERNAL MEDICINE

## 2017-03-14 PROCEDURE — 99232 SBSQ HOSP IP/OBS MODERATE 35: CPT | Performed by: INTERNAL MEDICINE

## 2017-03-14 RX ORDER — LISINOPRIL 40 MG/1
40 TABLET ORAL DAILY
COMMUNITY
End: 2017-03-16 | Stop reason: HOSPADM

## 2017-03-14 RX ADMIN — NICOTINE 1 PATCH: 21 PATCH TRANSDERMAL at 23:57

## 2017-03-14 RX ADMIN — METOPROLOL TARTRATE 50 MG: 50 TABLET ORAL at 21:09

## 2017-03-14 RX ADMIN — METOPROLOL TARTRATE 50 MG: 50 TABLET ORAL at 08:42

## 2017-03-14 RX ADMIN — TICAGRELOR 90 MG: 90 TABLET ORAL at 17:18

## 2017-03-14 RX ADMIN — FLUOXETINE 40 MG: 20 CAPSULE ORAL at 08:42

## 2017-03-14 RX ADMIN — NICOTINE 1 PATCH: 21 PATCH TRANSDERMAL at 02:19

## 2017-03-14 RX ADMIN — ATORVASTATIN CALCIUM 80 MG: 80 TABLET, FILM COATED ORAL at 21:09

## 2017-03-14 RX ADMIN — TICAGRELOR 90 MG: 90 TABLET ORAL at 08:42

## 2017-03-14 RX ADMIN — ASPIRIN 81 MG 81 MG: 81 TABLET ORAL at 08:57

## 2017-03-14 RX ADMIN — RAMIPRIL 5 MG: 5 CAPSULE ORAL at 08:42

## 2017-03-14 NOTE — PROGRESS NOTES
Continued Stay Note  [facility]AMILET Gayle     Patient Name: Edgar Carbajal  MRN: [id number]  Today's Date: 3/14/2017    Admit Date: 3/12/2017          Discharge Plan       03/14/17 1539    Case Management/Social Work Plan    Plan Plans to return home at discharge.  Anticipates DC 3/15/17              Discharge Codes     None        Expected Discharge Date and Time     Expected Discharge Date Expected Discharge Time    Mar 14, 2017             Luisa Saldivar

## 2017-03-14 NOTE — PLAN OF CARE
Problem: Cardiac Catheterization with/without PCI (Adult)  Goal: Signs and Symptoms of Listed Potential Problems Will be Absent or Manageable (Cardiac Catheterization with/without PCI)  Outcome: Ongoing (interventions implemented as appropriate)    Problem: Pain, Acute (Adult)  Goal: Acceptable Pain Control/Comfort Level  Outcome: Ongoing (interventions implemented as appropriate)    Problem: Hypertensive Disease/Crisis (Arterial) (Adult)  Goal: Signs and Symptoms of Listed Potential Problems Will be Absent or Manageable (Hypertensive Disease/Crisis)  Outcome: Ongoing (interventions implemented as appropriate)    Problem: Patient Care Overview (Adult)  Goal: Plan of Care Review  Outcome: Ongoing (interventions implemented as appropriate)

## 2017-03-14 NOTE — NURSING NOTE
Spoke with pt today about his referral to Cardiac Rehab.  Pt states he is interested however [facility] is to far from where he lives.  Pt declined his scheduled appointment time due to distance from his house.  Will look at sending referral to Cardiac Rehab in [place]. - Britney Thomas RN

## 2017-03-14 NOTE — PLAN OF CARE
Problem: Cardiac Catheterization with/without PCI (Adult)  Goal: Signs and Symptoms of Listed Potential Problems Will be Absent or Manageable (Cardiac Catheterization with/without PCI)  Outcome: Ongoing (interventions implemented as appropriate)    Problem: Pain, Acute (Adult)  Goal: Acceptable Pain Control/Comfort Level  Outcome: Ongoing (interventions implemented as appropriate)    Problem: Hypertensive Disease/Crisis (Arterial) (Adult)  Goal: Signs and Symptoms of Listed Potential Problems Will be Absent or Manageable (Hypertensive Disease/Crisis)  Outcome: Ongoing (interventions implemented as appropriate)    Problem: Patient Care Overview (Adult)  Goal: Plan of Care Review  Outcome: Ongoing (interventions implemented as appropriate)  Goal: Discharge Needs Assessment  Outcome: Ongoing (interventions implemented as appropriate)

## 2017-03-14 NOTE — PROGRESS NOTES
"[facility]-Cardiology Progress note     LOS: 2 days   Patient Care Team:  No Known Provider as PCP - General    Chief Complaint:  CP    Subjective     Interval History:     Patient Complaints: none  Patient Denies:  N/A  History taken from: patient RN    Review of Systems:   All systems were reviewed and negative      Objective     Vital Sign Min/Max for last 24 hours  Temp  Min: 97.9 °F (36.6 °C)  Max: 99.1 °F (37.3 °C)   BP  Min: 121/77  Max: 156/98   Pulse  Min: 70  Max: 81   Resp  Min: 16  Max: 18   SpO2  Min: 95 %  Max: 99 %   No Data Recorded   Weight  Min: 272 lb 8 oz (124 kg)  Max: 272 lb 8 oz (124 kg)     Flowsheet Rows         First Filed Value    Admission Height  69\" (175.3 cm) Documented at 03/12/2017 2042    Admission Weight  270 lb (122 kg) Documented at 03/12/2017 2042          Physical Exam:     General Appearance:    Alert, cooperative, in no acute distress   Head:    Normocephalic, without obvious abnormality, atraumatic   Eyes:            Lids and lashes normal, conjunctivae and sclerae normal, no   icterus, no pallor, corneas clear, PERRLA   Ears:    Ears appear intact with no abnormalities noted   Throat:   No oral lesions, no thrush, oral mucosa moist   Neck:   No adenopathy, supple, trachea midline, no thyromegaly, no   carotid bruit, no JVD   Back:     No kyphosis present, no scoliosis present, no skin lesions,      erythema or scars, no tenderness to percussion or                   palpation,   range of motion normal   Lungs:     Clear to auscultation,respirations regular, even and                  unlabored    Heart:    Regular rhythm and normal rate, normal S1 and S2, no            murmur, no gallop, no rub, no click   Chest Wall:    No abnormalities observed   Abdomen:     Normal bowel sounds, no masses, no organomegaly, soft        non-tender, non-distended, no guarding, no rebound                tenderness   Rectal:     Deferred   Extremities:   Moves all extremities well, no edema, no " cyanosis, no             redness   Pulses:   Pulses palpable and equal bilaterally   Skin:   No bleeding, bruising or rash   Lymph nodes:   No palpable adenopathy   Neurologic:   Cranial nerves 2 - 12 grossly intact, sensation intact, DTR       present and equal bilaterally        Results Review:     I reviewed the patient's new clinical results.    Results from last 7 days  Lab Units 03/13/17  0440   HEMOGLOBIN A1C % 5.8       Results from last 7 days  Lab Units 03/13/17  0440   SODIUM mmol/L 139   POTASSIUM mmol/L 4.2   CHLORIDE mmol/L 103   TOTAL CO2 mmol/L 28.0   BUN mg/dL 17   CREATININE mg/dL 1.00   GLUCOSE mg/dL 141*   CALCIUM mg/dL 9.0       Results from last 7 days  Lab Units 03/13/17  0440 03/12/17  2045   WBC 10*3/mm3 14.60* 11.16*   HEMOGLOBIN g/dL 14.5 16.6   HEMATOCRIT % 42.9 48.7   PLATELETS 10*3/mm3 259 303           Physical Exam    Medication Review: yes    Assessment/Plan     Principal Problem:    ST elevation myocardial infarction (STEMI) of inferior wall  Active Problems:    Coronary artery disease involving native coronary artery of native heart- Will proceed with staged revasc of OM1 bifurcation lesion with final kissing balloon stent expansion. This will require 8F sheath and guide and extra back-up support not achievable from radial approach.    Essential hypertension    ST elevation myocardial infarction (STEMI)            Dale Stuart MD  03/14/17  7:11 AM

## 2017-03-15 LAB
ANION GAP SERPL CALCULATED.3IONS-SCNC: 10.7 MMOL/L
BUN BLD-MCNC: 15 MG/DL (ref 7–20)
BUN/CREAT SERPL: 16.7 (ref 6.3–21.9)
CALCIUM SPEC-SCNC: 9.4 MG/DL (ref 8.4–10.2)
CHLORIDE SERPL-SCNC: 104 MMOL/L (ref 98–107)
CO2 SERPL-SCNC: 27 MMOL/L (ref 26–30)
CREAT BLD-MCNC: 0.9 MG/DL (ref 0.6–1.3)
GFR SERPL CREATININE-BSD FRML MDRD: 92 ML/MIN/1.73
GLUCOSE BLD-MCNC: 103 MG/DL (ref 74–98)
POTASSIUM BLD-SCNC: 4.7 MMOL/L (ref 3.5–5.1)
SODIUM BLD-SCNC: 137 MMOL/L (ref 137–145)

## 2017-03-15 PROCEDURE — C1894 INTRO/SHEATH, NON-LASER: HCPCS | Performed by: INTERNAL MEDICINE

## 2017-03-15 PROCEDURE — C1753 CATH, INTRAVAS ULTRASOUND: HCPCS | Performed by: INTERNAL MEDICINE

## 2017-03-15 PROCEDURE — 80048 BASIC METABOLIC PNL TOTAL CA: CPT | Performed by: INTERNAL MEDICINE

## 2017-03-15 PROCEDURE — 92920 PRQ TRLUML C ANGIOP 1ART&/BR: CPT | Performed by: INTERNAL MEDICINE

## 2017-03-15 PROCEDURE — B2111ZZ FLUOROSCOPY OF MULTIPLE CORONARY ARTERIES USING LOW OSMOLAR CONTRAST: ICD-10-PCS | Performed by: INTERNAL MEDICINE

## 2017-03-15 PROCEDURE — 85347 COAGULATION TIME ACTIVATED: CPT

## 2017-03-15 PROCEDURE — C1769 GUIDE WIRE: HCPCS | Performed by: INTERNAL MEDICINE

## 2017-03-15 PROCEDURE — C1887 CATHETER, GUIDING: HCPCS | Performed by: INTERNAL MEDICINE

## 2017-03-15 PROCEDURE — 4A023N7 MEASUREMENT OF CARDIAC SAMPLING AND PRESSURE, LEFT HEART, PERCUTANEOUS APPROACH: ICD-10-PCS | Performed by: INTERNAL MEDICINE

## 2017-03-15 PROCEDURE — C1725 CATH, TRANSLUMIN NON-LASER: HCPCS | Performed by: INTERNAL MEDICINE

## 2017-03-15 PROCEDURE — 25010000002 FENTANYL CITRATE (PF) 100 MCG/2ML SOLUTION: Performed by: INTERNAL MEDICINE

## 2017-03-15 PROCEDURE — 93458 L HRT ARTERY/VENTRICLE ANGIO: CPT | Performed by: INTERNAL MEDICINE

## 2017-03-15 PROCEDURE — 25010000002 MIDAZOLAM PER 1 MG: Performed by: INTERNAL MEDICINE

## 2017-03-15 PROCEDURE — 0 IOPAMIDOL PER 1 ML: Performed by: INTERNAL MEDICINE

## 2017-03-15 PROCEDURE — 92978 ENDOLUMINL IVUS OCT C 1ST: CPT | Performed by: INTERNAL MEDICINE

## 2017-03-15 PROCEDURE — 25010000002 HEPARIN (PORCINE) PER 1000 UNITS: Performed by: INTERNAL MEDICINE

## 2017-03-15 PROCEDURE — C1760 CLOSURE DEV, VASC: HCPCS | Performed by: INTERNAL MEDICINE

## 2017-03-15 RX ORDER — 0.9 % SODIUM CHLORIDE 0.9 %
10 VIAL (ML) INJECTION EVERY 8 HOURS SCHEDULED
Status: DISCONTINUED | OUTPATIENT
Start: 2017-03-15 | End: 2017-03-16

## 2017-03-15 RX ORDER — FENTANYL CITRATE 50 UG/ML
INJECTION, SOLUTION INTRAMUSCULAR; INTRAVENOUS AS NEEDED
Status: DISCONTINUED | OUTPATIENT
Start: 2017-03-15 | End: 2017-03-15 | Stop reason: HOSPADM

## 2017-03-15 RX ORDER — METOPROLOL TARTRATE 100 MG/1
100 TABLET ORAL EVERY 12 HOURS SCHEDULED
Status: DISCONTINUED | OUTPATIENT
Start: 2017-03-15 | End: 2017-03-15

## 2017-03-15 RX ORDER — MIDAZOLAM HYDROCHLORIDE 1 MG/ML
INJECTION INTRAMUSCULAR; INTRAVENOUS AS NEEDED
Status: DISCONTINUED | OUTPATIENT
Start: 2017-03-15 | End: 2017-03-15 | Stop reason: HOSPADM

## 2017-03-15 RX ORDER — METOPROLOL TARTRATE 100 MG/1
100 TABLET ORAL EVERY 12 HOURS SCHEDULED
Status: DISCONTINUED | OUTPATIENT
Start: 2017-03-15 | End: 2017-03-16 | Stop reason: HOSPADM

## 2017-03-15 RX ORDER — NITROGLYCERIN 5 MG/ML
INJECTION, SOLUTION INTRAVENOUS AS NEEDED
Status: DISCONTINUED | OUTPATIENT
Start: 2017-03-15 | End: 2017-03-15 | Stop reason: HOSPADM

## 2017-03-15 RX ORDER — LIDOCAINE HYDROCHLORIDE 20 MG/ML
INJECTION, SOLUTION INFILTRATION; PERINEURAL AS NEEDED
Status: DISCONTINUED | OUTPATIENT
Start: 2017-03-15 | End: 2017-03-15 | Stop reason: HOSPADM

## 2017-03-15 RX ORDER — METOPROLOL TARTRATE 50 MG/1
50 TABLET, FILM COATED ORAL ONCE
Status: CANCELLED | OUTPATIENT
Start: 2017-03-15

## 2017-03-15 RX ORDER — SODIUM CHLORIDE 9 MG/ML
100 INJECTION, SOLUTION INTRAVENOUS CONTINUOUS
Status: DISCONTINUED | OUTPATIENT
Start: 2017-03-15 | End: 2017-03-16

## 2017-03-15 RX ORDER — HEPARIN SODIUM 1000 [USP'U]/ML
INJECTION, SOLUTION INTRAVENOUS; SUBCUTANEOUS AS NEEDED
Status: DISCONTINUED | OUTPATIENT
Start: 2017-03-15 | End: 2017-03-15 | Stop reason: HOSPADM

## 2017-03-15 RX ADMIN — SODIUM CHLORIDE, PRESERVATIVE FREE 10 ML: 5 INJECTION INTRAVENOUS at 22:30

## 2017-03-15 RX ADMIN — TICAGRELOR 90 MG: 90 TABLET ORAL at 18:48

## 2017-03-15 RX ADMIN — FLUOXETINE 40 MG: 20 CAPSULE ORAL at 09:58

## 2017-03-15 RX ADMIN — ATORVASTATIN CALCIUM 80 MG: 80 TABLET, FILM COATED ORAL at 20:33

## 2017-03-15 RX ADMIN — CALCIUM CARBONATE 2 TABLET: 500 TABLET, CHEWABLE ORAL at 13:39

## 2017-03-15 RX ADMIN — HYDROCODONE BITARTRATE AND ACETAMINOPHEN 1 TABLET: 5; 325 TABLET ORAL at 19:12

## 2017-03-15 RX ADMIN — METOPROLOL TARTRATE 50 MG: 50 TABLET ORAL at 07:24

## 2017-03-15 RX ADMIN — RAMIPRIL 5 MG: 5 CAPSULE ORAL at 09:58

## 2017-03-15 RX ADMIN — METOPROLOL TARTRATE 100 MG: 100 TABLET ORAL at 18:47

## 2017-03-15 RX ADMIN — TICAGRELOR 90 MG: 90 TABLET ORAL at 07:24

## 2017-03-15 RX ADMIN — ASPIRIN 81 MG 81 MG: 81 TABLET ORAL at 07:24

## 2017-03-15 NOTE — NURSING NOTE
CRYSTAL Teaching for MI /Post PCI care pt able to give teachback on management and when to call the doctor or come to ER with what signs and symptoms

## 2017-03-15 NOTE — PLAN OF CARE
Problem: Cardiac Catheterization with/without PCI (Adult)  Goal: Signs and Symptoms of Listed Potential Problems Will be Absent or Manageable (Cardiac Catheterization with/without PCI)  Outcome: Ongoing (interventions implemented as appropriate)    Problem: Pain, Acute (Adult)  Goal: Acceptable Pain Control/Comfort Level  Outcome: Ongoing (interventions implemented as appropriate)    Problem: Hypertensive Disease/Crisis (Arterial) (Adult)  Goal: Signs and Symptoms of Listed Potential Problems Will be Absent or Manageable (Hypertensive Disease/Crisis)  Outcome: Ongoing (interventions implemented as appropriate)    Problem: Patient Care Overview (Adult)  Goal: Plan of Care Review  Outcome: Ongoing (interventions implemented as appropriate)  Goal: Adult Individualization and Mutuality  Outcome: Ongoing (interventions implemented as appropriate)

## 2017-03-16 VITALS
DIASTOLIC BLOOD PRESSURE: 99 MMHG | OXYGEN SATURATION: 98 % | HEART RATE: 94 BPM | BODY MASS INDEX: 39.4 KG/M2 | TEMPERATURE: 98.4 F | WEIGHT: 266 LBS | HEIGHT: 69 IN | SYSTOLIC BLOOD PRESSURE: 154 MMHG | RESPIRATION RATE: 19 BRPM

## 2017-03-16 LAB
ACT BLD: 242 SECONDS (ref 82–152)
ANION GAP SERPL CALCULATED.3IONS-SCNC: 9.4 MMOL/L
BASOPHILS # BLD AUTO: 0.04 10*3/MM3 (ref 0–0.2)
BASOPHILS NFR BLD AUTO: 0.3 % (ref 0–2.5)
BUN BLD-MCNC: 20 MG/DL (ref 7–20)
BUN/CREAT SERPL: 22.2 (ref 6.3–21.9)
CALCIUM SPEC-SCNC: 9.1 MG/DL (ref 8.4–10.2)
CHLORIDE SERPL-SCNC: 104 MMOL/L (ref 98–107)
CHOLEST SERPL-MCNC: 117 MG/DL (ref 0–199)
CO2 SERPL-SCNC: 29 MMOL/L (ref 26–30)
CREAT BLD-MCNC: 0.9 MG/DL (ref 0.6–1.3)
DEPRECATED RDW RBC AUTO: 43 FL (ref 37–54)
EOSINOPHIL # BLD AUTO: 0.02 10*3/MM3 (ref 0–0.7)
EOSINOPHIL NFR BLD AUTO: 0.1 % (ref 0–7)
ERYTHROCYTE [DISTWIDTH] IN BLOOD BY AUTOMATED COUNT: 13.2 % (ref 11.5–14.5)
GFR SERPL CREATININE-BSD FRML MDRD: 92 ML/MIN/1.73
GLUCOSE BLD-MCNC: 105 MG/DL (ref 74–98)
HCT VFR BLD AUTO: 40.5 % (ref 42–52)
HDLC SERPL-MCNC: 30 MG/DL (ref 40–60)
HGB BLD-MCNC: 13.5 G/DL (ref 14–18)
IMM GRANULOCYTES # BLD: 0.08 10*3/MM3 (ref 0–0.06)
IMM GRANULOCYTES NFR BLD: 0.6 % (ref 0–0.6)
LDLC SERPL CALC-MCNC: 71 MG/DL (ref 0–99)
LDLC/HDLC SERPL: 2.37 {RATIO}
LYMPHOCYTES # BLD AUTO: 2 10*3/MM3 (ref 0.6–3.4)
LYMPHOCYTES NFR BLD AUTO: 14.9 % (ref 10–50)
MCH RBC QN AUTO: 29.7 PG (ref 27–31)
MCHC RBC AUTO-ENTMCNC: 33.3 G/DL (ref 30–37)
MCV RBC AUTO: 89.2 FL (ref 80–94)
MONOCYTES # BLD AUTO: 1.53 10*3/MM3 (ref 0–0.9)
MONOCYTES NFR BLD AUTO: 11.4 % (ref 0–12)
MRSA SPEC QL CULT: NORMAL
NEUTROPHILS # BLD AUTO: 9.79 10*3/MM3 (ref 2–6.9)
NEUTROPHILS NFR BLD AUTO: 72.7 % (ref 37–80)
NRBC BLD MANUAL-RTO: 0 /100 WBC (ref 0–0)
PLATELET # BLD AUTO: 229 10*3/MM3 (ref 130–400)
PMV BLD AUTO: 10.8 FL (ref 6–12)
POTASSIUM BLD-SCNC: 4.4 MMOL/L (ref 3.5–5.1)
RBC # BLD AUTO: 4.54 10*6/MM3 (ref 4.7–6.1)
SODIUM BLD-SCNC: 138 MMOL/L (ref 137–145)
TRIGL SERPL-MCNC: 80 MG/DL
VLDLC SERPL-MCNC: 16 MG/DL
WBC NRBC COR # BLD: 13.46 10*3/MM3 (ref 4.8–10.8)

## 2017-03-16 PROCEDURE — 80061 LIPID PANEL: CPT | Performed by: INTERNAL MEDICINE

## 2017-03-16 PROCEDURE — 99238 HOSP IP/OBS DSCHRG MGMT 30/<: CPT | Performed by: INTERNAL MEDICINE

## 2017-03-16 PROCEDURE — 93005 ELECTROCARDIOGRAM TRACING: CPT | Performed by: INTERNAL MEDICINE

## 2017-03-16 PROCEDURE — 80048 BASIC METABOLIC PNL TOTAL CA: CPT | Performed by: INTERNAL MEDICINE

## 2017-03-16 PROCEDURE — 85025 COMPLETE CBC W/AUTO DIFF WBC: CPT | Performed by: INTERNAL MEDICINE

## 2017-03-16 PROCEDURE — 94799 UNLISTED PULMONARY SVC/PX: CPT

## 2017-03-16 RX ORDER — ATORVASTATIN CALCIUM 80 MG/1
80 TABLET, FILM COATED ORAL NIGHTLY
Qty: 30 TABLET | Refills: 11 | Status: SHIPPED | OUTPATIENT
Start: 2017-03-16 | End: 2018-04-08 | Stop reason: SDUPTHER

## 2017-03-16 RX ORDER — ASPIRIN 81 MG/1
81 TABLET, CHEWABLE ORAL DAILY
Qty: 30 TABLET | Refills: 11 | Status: SHIPPED | OUTPATIENT
Start: 2017-03-16 | End: 2018-04-08 | Stop reason: SDUPTHER

## 2017-03-16 RX ORDER — RAMIPRIL 5 MG/1
5 CAPSULE ORAL DAILY
Qty: 30 CAPSULE | Refills: 11 | Status: SHIPPED | OUTPATIENT
Start: 2017-03-16 | End: 2017-03-31 | Stop reason: HOSPADM

## 2017-03-16 RX ORDER — METOPROLOL TARTRATE 100 MG/1
100 TABLET ORAL EVERY 12 HOURS SCHEDULED
Qty: 60 TABLET | Refills: 11 | Status: SHIPPED | OUTPATIENT
Start: 2017-03-16 | End: 2018-04-08 | Stop reason: SDUPTHER

## 2017-03-16 RX ORDER — NICOTINE 21 MG/24HR
1 PATCH, TRANSDERMAL 24 HOURS TRANSDERMAL EVERY 24 HOURS
Qty: 20 PATCH | Refills: 0 | Status: SHIPPED | OUTPATIENT
Start: 2017-03-16

## 2017-03-16 RX ADMIN — SODIUM CHLORIDE, PRESERVATIVE FREE 10 ML: 5 INJECTION INTRAVENOUS at 06:37

## 2017-03-16 RX ADMIN — ASPIRIN 81 MG 81 MG: 81 TABLET ORAL at 09:35

## 2017-03-16 RX ADMIN — FLUOXETINE 40 MG: 20 CAPSULE ORAL at 09:35

## 2017-03-16 RX ADMIN — METOPROLOL TARTRATE 100 MG: 100 TABLET ORAL at 09:35

## 2017-03-16 RX ADMIN — TICAGRELOR 90 MG: 90 TABLET ORAL at 09:35

## 2017-03-16 RX ADMIN — NICOTINE 1 PATCH: 21 PATCH TRANSDERMAL at 03:01

## 2017-03-16 RX ADMIN — RAMIPRIL 5 MG: 5 CAPSULE ORAL at 09:35

## 2017-03-16 NOTE — NURSING NOTE
CRYSTAL Teaching for MI/ Post PCI  Pt able to give teachback regarding prevention, management, medications and when to call the doctor or come to ER with what signs and symptoms

## 2017-03-16 NOTE — PLAN OF CARE
Problem: Cardiac Catheterization with/without PCI (Adult)  Goal: Signs and Symptoms of Listed Potential Problems Will be Absent or Manageable (Cardiac Catheterization with/without PCI)  Outcome: Outcome(s) achieved Date Met:  03/16/17 03/16/17 1028   Cardiac Catheterization with/without PCI   Problems Assessed (Cardiac Catheterization) all   Problems Present (Cardiac Catheterization) none         Problem: Hypertensive Disease/Crisis (Arterial) (Adult)  Goal: Signs and Symptoms of Listed Potential Problems Will be Absent or Manageable (Hypertensive Disease/Crisis)  Outcome: Outcome(s) achieved Date Met:  03/16/17

## 2017-03-16 NOTE — DISCHARGE SUMMARY
[facility]-Cardiology Discharge Summary    Date of Discharge:  3/16/2017    Discharge Diagnosis:   Inferior ST elevation myocardial infarction  Severe 2 vessel coronary artery disease-native vessel  Subacute stent thrombosis  Tobacco abuse  Essential hypertension  Hyperlipidemia    Presenting Problem/History of Present Illness  ST elevation myocardial infarction (STEMI), unspecified artery [I21.3]  STEMI (ST elevation myocardial infarction) [I21.3]        Hospital Course  Patient is a 46 y.o. male presented with acute onset of chest pain.  In the emergency room the patient was identified as having ST segment elevation in the inferior leads consistent with an acute inferior ST elevation myocardial infarction.  He was taken emergently to the cardiac catheterization laboratory where angiograms revealed a severe stenosis in the proximal and mid right coronary artery which was stented with a large-caliber drug-eluting stent( 4.0 x 33 mm Xscience ).  Left coronary angiograms subsequently revealed that the circumflex obtuse marginal branch was thrombotic clinically occluded at its takeoff from the AV groove vessel.  Angioplasty and stenting was performed in this vessel as well.  Once flow was reestablished in this vessel it was then recognize that this vessel had originally been a bifurcating obtuse marginal branch and the stent extended from the ostium into the upper pole branch while the lower pole branch remained occluded.  Guidewire access was obtained into the lower pole branch and balloon angioplasty was performed through the struts of the jailing upper pole branch stent.  However balloon angioplasty results were grossly suboptimal.  Bifurcational stenting was then performed into the lower pole branch as well.  A final kissing balloon stent optimization technique was attempted but could not be performed as the patient's catheterization was performed through a 6 B[place]udian radial artery access and the 2 angioplasty balloon  catheters would barely fit through the lumen of this guiding catheter such that in the attempts to finally position the 2 balloons across the lesions in the appropriate locations as 1 catheter was manipulated the other would move out of position.  At this point in the procedure the patient had already received 5 grade of radiation and significant contrast load.  The procedure was greatly complicated due to difficulty engaging the left main coronary artery from the left radial approach.  Multiple guiding catheters were used initially.  The patient was having difficulty holding his left arm over his lower abdomen on several occasions while accidentally moving his arm engagement of the left main was lost.  During the final attempt at kissing balloon angioplasty movement of the left arm resulted in complete loss of guiding catheter support as well as guidewire access to both vessels.  Multiple attempts were made to reengage the left main using different guiding catheters but was not possible.  Given the amount of x-ray exposure the patient had received and the difficulty encountered from the left radial approach the decision was made to abort the procedure and return in 48 hours from a femoral approach using an 8 Ukrainian sheath system in order to optimize stent deployment in the obtuse marginal upper and lower pole branches using a kissing balloon technique.  The patient on final angiograms demonstrated patency of both stents with RAUL 3 flow distally with no residual thrombus and no evidence of dissection.  The patient did well post procedure he had no recurrent angina his chest discomfort resolved completely and his ST segments returned to baseline.  48 hours later the patient was brought back to the catheterization laboratory and access was obtained from the right femoral approach.  An 8 Ukrainian sheath was placed and the left main was engaged with a 8 Ukrainian every 4 guiding catheter.  Surprisingly the patient had  developed subacute stent thrombosis silently.  The obtuse marginal branch which had revealed a sleep been stented with bifurcational stenting was now flush occluded at its takeoff from the AV groove vessel.  Multiple attempts were made to gain access into this vessel using a variety of guidewires and a variety of guidewire techniques.  Some limited balloon angioplasty was performed right at the ostium of the occluded obtuse marginal branch without any improvement in lumen gain or convincing evidence that the guidewire had been placed in a true lumen versus the guidewire passing between the vessel wall and the stent struts.  After multiple attempts and an additional 3 gray of radiation exposure it was decided to abort further attempts that opening this occluded vessel.  The patient remained chest pain-free and stable throughout the entire procedure.  The right coronary with artery was then reengaged with a 6 Nepali JR4 guiding catheter and intravascular ultrasound was performed to confirm that there had been adequate stent placement in the right coronary artery.  Intravascular ultrasound disclosed that the midportion of the stent was slightly under expanded and the distal portion of the stent was not completely opposed to the vessel wall.  Balloon angioplasty was performed with an oversized noncompliant balloon-4.5 x 8 mm with 12-14 avila inflations in 5 locations.  Repeat intravascular ultrasound demonstrated complete stent expansion and complete apposition of the stent to the vessel wall.  There was no residual narrowing, proximal or distal edge dissection or thrombus.  Distal coronary blood flow was RAUL grade 3.  Finally contrast ventriculogram was performed to assess the ejection fraction.  His left ventricular ejection fraction was 50% with posterior and lateral hypokinesis with no mitral regurgitation.  Between the 2 procedures the patient received a total of 8 gray of radiation.  His chest and back was  inspected and showed no signs of erythema.  The patient was counseled that he needed to observe his chest and back for the next 2 months for any signs of hair loss or erythema.  During the procedure we were alerted at 1.5 gray intervals and the camera angle was adjusted slightly to avoid concentration of the x-ray to one area of the skin.    Procedures Performed  Procedure(s):  Percutaneous Coronary Intervention Bifurcation stent optimization by Final kissing balloon- Staged revasc   Coronary angiography  Left ventriculography  Angioplasty-coronary   Intravascular ultrasound of the right coronary artery  Stenting ×1 of the right coronary artery- drug-eluting stent  Stenting ×2 of the circumflex obtuse marginal branch-drug-eluting stents       Consults:   Consults     No orders found from 2/11/2017 to 3/13/2017.          Pertinent Test Results: labs: Lab reviewed      Cath Ejection Fraction Quantitative  LVEF: 50%    Condition on Discharge:  Stable      Vital Signs  Temp:  [98.4 °F (36.9 °C)-99.4 °F (37.4 °C)] 98.8 °F (37.1 °C)  Heart Rate:  [73-83] 73  Resp:  [16-20] 18  BP: ()/(51-85) 124/70    Physical Exam:     General Appearance:    Alert, cooperative, in no acute distress   Head:    Normocephalic, without obvious abnormality, atraumatic   Eyes:            Lids and lashes normal, conjunctivae and sclerae normal, no   icterus, no pallor, corneas clear, PERRLA   Ears:    Ears appear intact with no abnormalities noted   Throat:   No oral lesions, no thrush, oral mucosa moist   Neck:   No adenopathy, supple, trachea midline, no thyromegaly, no   carotid bruit, no JVD   Back:     No kyphosis present, no scoliosis present, no skin lesions,      erythema or scars, no tenderness to percussion or                   palpation,   range of motion normal   Lungs:     Clear to auscultation,respirations regular, even and                  unlabored    Heart:    Regular rhythm and normal rate, normal S1 and S2, no             murmur, no gallop, no rub, no click   Chest Wall:    No abnormalities observed   Abdomen:     Normal bowel sounds, no masses, no organomegaly, soft        non-tender, non-distended, no guarding, no rebound                tenderness   Rectal:     Deferred   Extremities:   Moves all extremities well, no edema, no cyanosis, no             redness   Pulses:   Pulses palpable and equal bilaterally   Skin:   No bleeding, bruising or rash   Lymph nodes:   No palpable adenopathy   Neurologic:   Cranial nerves 2 - 12 grossly intact, sensation intact, DTR       present and equal bilaterally       Discharge Disposition  Home or Self Care    Discharge Medications   Edgar Carbajal   Home Medication Instructions KAYE:[id number]    Printed on:03/16/17 0547   Medication Information                      aspirin 81 MG chewable tablet  Chew 1 tablet Daily.             atorvastatin (LIPITOR) 80 MG tablet  Take 1 tablet by mouth Every Night.             FLUoxetine (PROzac) 20 MG capsule  Take 40 mg by mouth Daily.             metoprolol tartrate (LOPRESSOR) 100 MG tablet  Take 1 tablet by mouth Every 12 (Twelve) Hours.             nicotine (NICODERM CQ) 21 MG/24HR patch  Place 1 patch on the skin Daily.             ramipril (ALTACE) 5 MG capsule  Take 1 capsule by mouth Daily.             ticagrelor (BRILINTA) 90 MG tablet tablet  Take 1 tablet by mouth 2 (Two) Times a Day.                 Discharge Diet:     Activity at Discharge:     Follow-up Appointments  Future Appointments  Date Time Provider Department Center   4/6/2017 9:00 AM  [facility]         Test Results Pending at Discharge       Dale Stuart MD  03/16/17  7:50 AM    Time: Discharge 25 min

## 2017-03-16 NOTE — PROGRESS NOTES
Continued Stay Note  YAMILET Gayle     Patient Name: Edgar Carbajal  MRN: [id number]  Today's Date: 3/16/2017    Admit Date: 3/12/2017          Discharge Plan       03/16/17 1128    Case Management/Social Work Plan    Plan Rafael followed up with patient and nurse to review Brilinta coupon process.  He states he still has the coupon, has insurance and has a ride home. Notiifed CM.     Patient/Family In Agreement With Plan yes    Additional Comments Coupon given for Brilinita    Final Note    Final Note Discussed and verified medication access procedures.               Discharge Codes     None        Expected Discharge Date and Time     Expected Discharge Date Expected Discharge Time    Mar 16, 2017             Lashawn Cortze

## 2017-03-21 ENCOUNTER — TELEPHONE (OUTPATIENT)
Dept: CARDIOLOGY | Facility: CLINIC | Age: 47
End: 2017-03-21

## 2017-03-21 NOTE — TELEPHONE ENCOUNTER
Patient is needing PA on Brilinta medication and I noticed that he doesn't have a follow from [facility].  When did you want to see the patient?  Kary Daly MA

## 2017-03-22 NOTE — TELEPHONE ENCOUNTER
In about 2 weeks. Ok to change him over to Plavix after his first month of Birlinta. He should have gotten a 30 day free medication card for Birlinta prior to leaving the hospital.Make sure that he has 30 day supply of Birlinta now.

## 2017-03-28 RX ORDER — CLOPIDOGREL BISULFATE 75 MG/1
75 TABLET ORAL DAILY
Qty: 90 TABLET | Refills: 3 | Status: SHIPPED | OUTPATIENT
Start: 2017-03-28 | End: 2018-04-08 | Stop reason: SDUPTHER

## 2017-03-28 NOTE — TELEPHONE ENCOUNTER
Insurance request Plavix medication to be used in place of the Brilinta. Patient notified and we will give him a new Rx for Plavix. Patient to finish Brilinta first.  Patient states understanding.    Insurance information:  ID # [id number]  [phone] [facility]  For PA's [phone] opt #[phone]  Fax for PA's [phone]

## 2017-03-30 ENCOUNTER — TRANSCRIBE ORDERS (OUTPATIENT)
Dept: CARDIAC REHAB | Facility: HOSPITAL | Age: 47
End: 2017-03-30

## 2017-03-30 DIAGNOSIS — Z95.5 S/P PRIMARY ANGIOPLASTY WITH CORONARY STENT: ICD-10-CM

## 2017-03-30 DIAGNOSIS — I21.4 NSTEMI (NON-ST ELEVATED MYOCARDIAL INFARCTION) (HCC): Primary | ICD-10-CM

## 2017-03-31 ENCOUNTER — OFFICE VISIT (OUTPATIENT)
Dept: CARDIOLOGY | Facility: CLINIC | Age: 47
End: 2017-03-31

## 2017-03-31 VITALS
WEIGHT: 265.4 LBS | BODY MASS INDEX: 39.31 KG/M2 | SYSTOLIC BLOOD PRESSURE: 120 MMHG | HEART RATE: 60 BPM | DIASTOLIC BLOOD PRESSURE: 80 MMHG | RESPIRATION RATE: 16 BRPM | OXYGEN SATURATION: 99 % | HEIGHT: 69 IN

## 2017-03-31 DIAGNOSIS — I10 ESSENTIAL HYPERTENSION: ICD-10-CM

## 2017-03-31 DIAGNOSIS — I25.10 CORONARY ARTERY DISEASE INVOLVING NATIVE CORONARY ARTERY OF NATIVE HEART WITHOUT ANGINA PECTORIS: Primary | ICD-10-CM

## 2017-03-31 PROCEDURE — 99213 OFFICE O/P EST LOW 20 MIN: CPT | Performed by: INTERNAL MEDICINE

## 2017-03-31 NOTE — PROGRESS NOTES
Subjective:     Encounter Date:03/31/2017      Patient ID: Edgar Carbajal is a 46 y.o. male.    Chief Complaint: Coronary artery disease  HPI  This is a 46-year-old male patient who presented to the emergency room with an inferior posterior lateral myocardial infarction.  The patient underwent revascularization of the proximal and mid right coronary artery as well as the distal circumflex.  The patient had silent subacute stent thrombosis.  He was brought back to the cardiac catheterization laboratory after 48 hours but his occluded circumflex could not be reopened.  His overall left ventricular ejection fraction is well-preserved.  Since hospital discharge the patient has had no recurrent chest discomfort.  There is no exertional chest arm neck jaw shoulder or back discomfort.  There is no orthopnea PND or lower extremity edema.  There is no dizziness palpitations or syncope.  The patient discontinue cigarette smoking with his initial presentation and has not restarted.  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  The remainder of his past medical history, family history and social history are unchanged compared to his admission history and physical.  The following portions of the patient's history were reviewed and updated as appropriate: allergies, current medications, past family history, past medical history, past social history, past surgical history and problem  Review of Systems   Constitution: Negative for chills, diaphoresis, fever, malaise/fatigue, weight gain and weight loss.   HENT: Negative for ear discharge, hearing loss, hoarse voice and nosebleeds.    Eyes: Negative for discharge, double vision, pain and photophobia.   Cardiovascular: Negative for chest pain, claudication, cyanosis, dyspnea on exertion, irregular heartbeat, leg swelling, near-syncope, orthopnea, palpitations, paroxysmal nocturnal dyspnea and syncope.   Respiratory: Negative for cough, hemoptysis, shortness of  breath, sputum production and wheezing.    Endocrine: Negative for cold intolerance, heat intolerance, polydipsia, polyphagia and polyuria.   Hematologic/Lymphatic: Negative for adenopathy and bleeding problem. Does not bruise/bleed easily.   Skin: Negative for color change, flushing, itching and rash.   Musculoskeletal: Negative for muscle cramps, muscle weakness, myalgias and stiffness.   Gastrointestinal: Negative for abdominal pain, diarrhea, hematemesis, hematochezia, nausea and vomiting.   Genitourinary: Negative for dysuria, frequency and nocturia.   Neurological: Negative for dizziness, focal weakness, loss of balance, numbness, paresthesias and seizures.   Psychiatric/Behavioral: Negative for altered mental status, hallucinations and suicidal ideas.   Allergic/Immunologic: Negative for HIV exposure, hives and persistent infections.       Current Outpatient Prescriptions:   •  aspirin 81 MG chewable tablet, Chew 1 tablet Daily., Disp: 30 tablet, Rfl: 11  •  atorvastatin (LIPITOR) 80 MG tablet, Take 1 tablet by mouth Every Night., Disp: 30 tablet, Rfl: 11  •  FLUoxetine (PROzac) 20 MG capsule, Take 40 mg by mouth Daily., Disp: , Rfl:   •  metoprolol tartrate (LOPRESSOR) 100 MG tablet, Take 1 tablet by mouth Every 12 (Twelve) Hours., Disp: 60 tablet, Rfl: 11  •  nicotine (NICODERM CQ) 21 MG/24HR patch, Place 1 patch on the skin Daily., Disp: 20 patch, Rfl: 0  •  ticagrelor (BRILINTA) 90 MG tablet tablet, Take 1 tablet by mouth 2 (Two) Times a Day., Disp: 60 tablet, Rfl: 11  •  clopidogrel (PLAVIX) 75 MG tablet, Take 1 tablet by mouth Daily., Disp: 90 tablet, Rfl: 3     Objective:     Physical Exam   Constitutional: He is oriented to person, place, and time. He appears well-developed and well-nourished.   HENT:   Head: Normocephalic and atraumatic.   Eyes: Conjunctivae are normal. No scleral icterus.   Cardiovascular: Normal rate, regular rhythm, normal heart sounds and intact distal pulses.  Exam reveals no  "gallop and no friction rub.    No murmur heard.  Pulmonary/Chest: Effort normal and breath sounds normal. No respiratory distress.   Abdominal: Soft. Bowel sounds are normal. There is no tenderness.   Musculoskeletal: He exhibits no edema.   Neurological: He is alert and oriented to person, place, and time.   Skin: Skin is warm and dry.   Psychiatric: He has a normal mood and affect. His behavior is normal.     Blood pressure 120/80, pulse 60, resp. rate 16, height 69\" (175.3 cm), weight 265 lb 6.4 oz (120 kg), SpO2 99 %.   Lab Review:       Assessment:         1. Coronary artery disease involving native coronary artery of native heart without angina pectoris  Stable and angina free.  No evidence of recurrent ischemic heart disease.  No signs or symptoms of congestive heart failure or arrhythmia.    2. Essential hypertension  His blood pressure control is excellent.  Procedures     Plan:       The patient is cleared to return to work.  He has no exertional limitations.  He will transition from Birlinta to Plavix once the 30 day supply of Birlinta  has been exhausted, due to medication cost.  He has been congratulated on his smoking cessation and cautioned to never again resumed cigarette smoking.  No changes in his medication profile other than what has been described above has been made.  No additional cardiovascular testing is indicated.  The patient declined to participate in cardiac rehabilitation.         "

## 2017-12-19 ENCOUNTER — TELEPHONE (OUTPATIENT)
Dept: CARDIOLOGY | Facility: CLINIC | Age: 47
End: 2017-12-19

## 2017-12-19 NOTE — TELEPHONE ENCOUNTER
Patient left Vm stating that he has been having left arm tingling and pain. Returned patient call to advise him to make an appointment or go to ED.  Patient stated that he was at the ED ([facility]) when I called.

## 2017-12-22 ENCOUNTER — OFFICE VISIT (OUTPATIENT)
Dept: CARDIOLOGY | Facility: CLINIC | Age: 47
End: 2017-12-22

## 2017-12-22 VITALS
HEIGHT: 69 IN | RESPIRATION RATE: 17 BRPM | OXYGEN SATURATION: 99 % | HEART RATE: 65 BPM | SYSTOLIC BLOOD PRESSURE: 130 MMHG | BODY MASS INDEX: 40.58 KG/M2 | DIASTOLIC BLOOD PRESSURE: 90 MMHG | WEIGHT: 274 LBS

## 2017-12-22 DIAGNOSIS — I25.10 CORONARY ARTERY DISEASE INVOLVING NATIVE CORONARY ARTERY OF NATIVE HEART WITHOUT ANGINA PECTORIS: Primary | ICD-10-CM

## 2017-12-22 DIAGNOSIS — I10 ESSENTIAL HYPERTENSION: ICD-10-CM

## 2017-12-22 PROCEDURE — 99213 OFFICE O/P EST LOW 20 MIN: CPT | Performed by: INTERNAL MEDICINE

## 2017-12-22 NOTE — PROGRESS NOTES
Subjective:     Encounter Date:12/22/2017      Patient ID: Edgar Carbajal is a 47 y.o. male.    Chief Complaint:Left arm numbness  HPI  This is a 47-year-old male patient with known coronary artery disease who has been experiencing numbness and tingling in his left hand radiating up his left arm.  He was recently seen in the emergency room were his cardiac workup was negative.  He was diagnosed with carpal tunnel syndrome but has had no formal follow-up.  He does not have a primary care provider.  He has had no chest discomfort at rest or with activity.  There is no exertional chest arm neck jaw shoulder or back discomfort.  He has no orthopnea PND or lower extremity edema.  There is no dizziness palpitations or syncope.  The following portions of the patient's history were reviewed and updated as appropriate: allergies, current medications, past family history, past medical history, past social history, past surgical history and problem  Review of Systems   Constitution: Negative for chills, diaphoresis, fever, weakness, malaise/fatigue, night sweats, weight gain and weight loss.   HENT: Negative for ear discharge, hearing loss, hoarse voice and nosebleeds.    Eyes: Negative for discharge, double vision, pain and photophobia.   Cardiovascular: Negative for chest pain, claudication, cyanosis, dyspnea on exertion, irregular heartbeat, leg swelling, near-syncope, orthopnea, palpitations, paroxysmal nocturnal dyspnea and syncope.   Respiratory: Negative for cough, hemoptysis, shortness of breath, sputum production and wheezing.    Endocrine: Negative for cold intolerance, heat intolerance, polydipsia, polyphagia and polyuria.   Hematologic/Lymphatic: Negative for adenopathy and bleeding problem. Does not bruise/bleed easily.   Skin: Negative for color change, flushing, itching and rash.   Musculoskeletal: Negative for muscle cramps, muscle weakness, myalgias and stiffness.   Gastrointestinal: Negative for  abdominal pain, diarrhea, hematemesis, hematochezia, nausea and vomiting.   Genitourinary: Negative for dysuria, frequency and nocturia.   Neurological: Positive for numbness and paresthesias. Negative for focal weakness, loss of balance and seizures.   Psychiatric/Behavioral: Negative for altered mental status, hallucinations and suicidal ideas.   Allergic/Immunologic: Negative for HIV exposure, hives and persistent infections.       Current Outpatient Prescriptions:   •  aspirin 81 MG chewable tablet, Chew 1 tablet Daily., Disp: 30 tablet, Rfl: 11  •  atorvastatin (LIPITOR) 80 MG tablet, Take 1 tablet by mouth Every Night., Disp: 30 tablet, Rfl: 11  •  clopidogrel (PLAVIX) 75 MG tablet, Take 1 tablet by mouth Daily., Disp: 90 tablet, Rfl: 3  •  FLUoxetine (PROzac) 20 MG capsule, Take 40 mg by mouth Daily., Disp: , Rfl:   •  metoprolol tartrate (LOPRESSOR) 100 MG tablet, Take 1 tablet by mouth Every 12 (Twelve) Hours., Disp: 60 tablet, Rfl: 11  •  nicotine (NICODERM CQ) 21 MG/24HR patch, Place 1 patch on the skin Daily., Disp: 20 patch, Rfl: 0  •  ticagrelor (BRILINTA) 90 MG tablet tablet, Take 1 tablet by mouth 2 (Two) Times a Day., Disp: 60 tablet, Rfl: 11     Objective:     Physical Exam   Constitutional: He is oriented to person, place, and time. He appears well-developed and well-nourished.   HENT:   Head: Normocephalic and atraumatic.   Eyes: Conjunctivae are normal. No scleral icterus.   Cardiovascular: Normal rate, regular rhythm, normal heart sounds and intact distal pulses.  Exam reveals no gallop and no friction rub.    No murmur heard.  Pulmonary/Chest: Effort normal and breath sounds normal. No respiratory distress.   Abdominal: Soft. Bowel sounds are normal. There is no tenderness.   Musculoskeletal: He exhibits no edema.   Neurological: He is alert and oriented to person, place, and time.   Skin: Skin is warm and dry.   Psychiatric: He has a normal mood and affect. His behavior is normal.     Blood  "pressure 130/90, pulse 65, resp. rate 17, height 175.3 cm (69.02\"), weight 124 kg (274 lb), SpO2 99 %.   Lab Review:       Assessment:         1. Coronary artery disease involving native coronary artery of native heart without angina pectoris  Stable and angina free.    2. Essential hypertension  Acceptable blood pressure control.    Procedures     Plan:       We will make arrangements for the patient to see a local primary care provider.  They will probably provide local measures for such as physical therapy as well as Cock-up wrist splint.  If necessary he may need to see an orthopedic surgeon.  No additional cardiovascular testing is necessary at this point.  The importance of diet exercise and weight management have been reemphasize.  The importance of continued dual antiplatelet therapy has been reemphasized.         "

## 2018-04-09 RX ORDER — RAMIPRIL 5 MG/1
CAPSULE ORAL
Qty: 30 CAPSULE | Refills: 10
Start: 2018-04-09

## 2018-04-09 RX ORDER — ATORVASTATIN CALCIUM 80 MG/1
80 TABLET, FILM COATED ORAL
Qty: 30 TABLET | Refills: 3 | Status: SHIPPED | OUTPATIENT
Start: 2018-04-09 | End: 2018-12-03 | Stop reason: SDUPTHER

## 2018-04-09 RX ORDER — ATORVASTATIN CALCIUM 80 MG/1
TABLET, FILM COATED ORAL
Qty: 30 TABLET | Refills: 3 | Status: SHIPPED | OUTPATIENT
Start: 2018-04-09 | End: 2018-04-09 | Stop reason: SDUPTHER

## 2018-04-09 RX ORDER — ASPIRIN 81 MG/1
TABLET, CHEWABLE ORAL
Qty: 30 TABLET | Refills: 3 | Status: SHIPPED | OUTPATIENT
Start: 2018-04-09 | End: 2018-04-09 | Stop reason: SDUPTHER

## 2018-04-09 RX ORDER — METOPROLOL TARTRATE 100 MG/1
100 TABLET ORAL EVERY 12 HOURS
Qty: 60 TABLET | Refills: 3 | Status: SHIPPED | OUTPATIENT
Start: 2018-04-09

## 2018-04-09 RX ORDER — CLOPIDOGREL BISULFATE 75 MG/1
75 TABLET ORAL DAILY
Qty: 30 TABLET | Refills: 3 | Status: SHIPPED | OUTPATIENT
Start: 2018-04-09 | End: 2018-09-16 | Stop reason: SDUPTHER

## 2018-04-09 RX ORDER — CLOPIDOGREL BISULFATE 75 MG/1
TABLET ORAL
Qty: 30 TABLET | Refills: 3 | Status: SHIPPED | OUTPATIENT
Start: 2018-04-09 | End: 2018-04-09 | Stop reason: SDUPTHER

## 2018-04-09 RX ORDER — METOPROLOL TARTRATE 100 MG/1
TABLET ORAL
Qty: 60 TABLET | Refills: 3 | Status: SHIPPED | OUTPATIENT
Start: 2018-04-09 | End: 2018-04-09 | Stop reason: SDUPTHER

## 2018-04-09 RX ORDER — ASPIRIN 81 MG/1
81 TABLET, CHEWABLE ORAL DAILY
Qty: 30 TABLET | Refills: 3 | Status: SHIPPED | OUTPATIENT
Start: 2018-04-09 | End: 2018-09-16 | Stop reason: SDUPTHER

## 2018-09-17 RX ORDER — CLOPIDOGREL BISULFATE 75 MG/1
TABLET ORAL
Qty: 30 TABLET | Refills: 2 | Status: SHIPPED | OUTPATIENT
Start: 2018-09-17

## 2018-09-17 RX ORDER — ASPIRIN 81 MG
TABLET,CHEWABLE ORAL
Qty: 30 TABLET | Refills: 2 | Status: SHIPPED | OUTPATIENT
Start: 2018-09-17

## 2018-12-03 RX ORDER — ATORVASTATIN CALCIUM 80 MG/1
TABLET, FILM COATED ORAL
Qty: 30 TABLET | Refills: 0 | Status: SHIPPED | OUTPATIENT
Start: 2018-12-03

## 2019-01-14 RX ORDER — CLOPIDOGREL BISULFATE 75 MG/1
TABLET ORAL
Qty: 30 TABLET | Refills: 1 | OUTPATIENT
Start: 2019-01-14

## 2019-01-14 RX ORDER — ASPIRIN 81 MG/1
TABLET, CHEWABLE ORAL
Qty: 30 TABLET | Refills: 1 | OUTPATIENT
Start: 2019-01-14

## 2019-07-30 RX ORDER — CLOPIDOGREL BISULFATE 75 MG/1
TABLET ORAL
Qty: 30 TABLET | Refills: 2 | OUTPATIENT
Start: 2019-07-30

## 2020-04-15 ENCOUNTER — OUTSIDE FACILITY SERVICE (OUTPATIENT)
Dept: CARDIOLOGY | Facility: CLINIC | Age: 50
End: 2020-04-15

## 2020-04-15 PROCEDURE — 78452 HT MUSCLE IMAGE SPECT MULT: CPT | Performed by: INTERNAL MEDICINE

## 2020-04-15 PROCEDURE — 93018 CV STRESS TEST I&R ONLY: CPT | Performed by: INTERNAL MEDICINE

## 2022-03-04 ENCOUNTER — TELEPHONE (OUTPATIENT)
Dept: FAMILY MEDICINE CLINIC | Facility: CLINIC | Age: 52
End: 2022-03-04

## 2022-03-04 NOTE — TELEPHONE ENCOUNTER
"Called to remind of new patient appointment; bring ID, insurance, list of doctors seen and medications; and to reschedule if necessary according to covid screening. No answer and no voicemail. \"Call could not be completed at this time.\"  "

## (undated) DEVICE — CATH DIAG EXPO .056 AL2 6F 100CM

## (undated) DEVICE — ANGIOGRAPHIC CATHETER: Brand: EXPO™

## (undated) DEVICE — CATH IMG IVUS VISION PV DIG 0.014P 5F 150CM

## (undated) DEVICE — SI AVANTI+ 8F STD W/GW  NO OBT: Brand: AVANTI

## (undated) DEVICE — ELECTRD PAD DEFIB A/

## (undated) DEVICE — RUNTHROUGH NS EXTRA FLOPPY PTCA GUIDEWIRE: Brand: RUNTHROUGH

## (undated) DEVICE — GUIDE CATHETER: Brand: MACH1™

## (undated) DEVICE — INFLATION DEVICE: Brand: ENCORE™ 26

## (undated) DEVICE — GUIDEWIRE WITH ICE™ HYDROPHILIC COATING: Brand: CHOICE™ PT

## (undated) DEVICE — CATH F6 ST JR 4 100CM: Brand: SUPERTORQUE

## (undated) DEVICE — TREK CORONARY DILATATION CATHETER 2.50 MM X 12 MM / RAPID-EXCHANGE: Brand: TREK

## (undated) DEVICE — GW INQWIRE FC PTFE STD J/1.5 .035 260

## (undated) DEVICE — ANGIO-SEAL VIP VASCULAR CLOSURE DEVICE: Brand: ANGIO-SEAL

## (undated) DEVICE — Device

## (undated) DEVICE — TREK CORONARY DILATATION CATHETER 2.50 MM X 20 MM/ OVER-THE-WIRE: Brand: TREK

## (undated) DEVICE — TREK CORONARY DILATATION CATHETER 2.50 MM X 20 MM / RAPID-EXCHANGE: Brand: TREK

## (undated) DEVICE — TR BAND RADIAL ARTERY COMPRESSION DEVICE: Brand: TR BAND

## (undated) DEVICE — TREK CORONARY DILATATION CATHETER 2.50 MM X 15 MM / RAPID-EXCHANGE: Brand: TREK

## (undated) DEVICE — HI-TORQUE WHISPER MS GUIDE WIRE .014 STRAIGHT TIP 3.0 CM X 300 CM: Brand: HI-TORQUE WHISPER

## (undated) DEVICE — GLIDESHEATH SLENDER STAINLESS STEEL KIT: Brand: GLIDESHEATH SLENDER

## (undated) DEVICE — CATH DIAG IMPULSE FL3.5 6F 100CM

## (undated) DEVICE — NDL ANGIOGR ADV THN SMOTH SGLWALL 21G 1.5

## (undated) DEVICE — GUIDEWIRE: Brand: PT²™

## (undated) DEVICE — CATH F6 ST JL 4 100CM: Brand: SUPERTORQUE